# Patient Record
Sex: FEMALE | Race: WHITE | Employment: OTHER | URBAN - METROPOLITAN AREA
[De-identification: names, ages, dates, MRNs, and addresses within clinical notes are randomized per-mention and may not be internally consistent; named-entity substitution may affect disease eponyms.]

---

## 2020-04-05 ENCOUNTER — HOSPITAL ENCOUNTER (EMERGENCY)
Age: 45
Discharge: HOME | End: 2020-04-05
Payer: COMMERCIAL

## 2020-04-05 VITALS
TEMPERATURE: 97.9 F | RESPIRATION RATE: 17 BRPM | DIASTOLIC BLOOD PRESSURE: 70 MMHG | OXYGEN SATURATION: 100 % | HEART RATE: 74 BPM | SYSTOLIC BLOOD PRESSURE: 121 MMHG | HEIGHT: 68 IN

## 2020-04-05 LAB
ACETAMINOPHEN LEVEL: <5 UG/ML (ref 15–30)
ALBUMIN SERPL-MCNC: 4 GM/DL (ref 3.4–5)
ALCOHOL SCREEN SERUM: NORMAL %WT/VOL
ALP BLD-CCNC: 83 IU/L (ref 40–129)
ALT SERPL-CCNC: 28 U/L (ref 10–40)
ANION GAP SERPL CALCULATED.3IONS-SCNC: 11 MMOL/L (ref 4–16)
AST SERPL-CCNC: 23 IU/L (ref 15–37)
BASOPHILS ABSOLUTE: 0.1 K/CU MM
BASOPHILS RELATIVE PERCENT: 0.5 % (ref 0–1)
BILIRUB SERPL-MCNC: 0.2 MG/DL (ref 0–1)
BUN BLDV-MCNC: 15 MG/DL (ref 6–23)
CALCIUM SERPL-MCNC: 9.4 MG/DL (ref 8.3–10.6)
CHLORIDE BLD-SCNC: 107 MMOL/L (ref 99–110)
CO2: 20 MMOL/L (ref 21–32)
CREAT SERPL-MCNC: 0.7 MG/DL (ref 0.6–1.1)
DIFFERENTIAL TYPE: ABNORMAL
DOSE AMOUNT: ABNORMAL
DOSE AMOUNT: ABNORMAL
DOSE TIME: ABNORMAL
DOSE TIME: ABNORMAL
EOSINOPHILS ABSOLUTE: 0.1 K/CU MM
EOSINOPHILS RELATIVE PERCENT: 1.1 % (ref 0–3)
GFR AFRICAN AMERICAN: >60 ML/MIN/1.73M2
GFR NON-AFRICAN AMERICAN: >60 ML/MIN/1.73M2
GLUCOSE BLD-MCNC: 111 MG/DL (ref 70–99)
HCT VFR BLD CALC: 42 % (ref 37–47)
HEMOGLOBIN: 13.2 GM/DL (ref 12.5–16)
IMMATURE NEUTROPHIL %: 0.5 % (ref 0–0.43)
LYMPHOCYTES ABSOLUTE: 2.9 K/CU MM
LYMPHOCYTES RELATIVE PERCENT: 26.4 % (ref 24–44)
MCH RBC QN AUTO: 28.7 PG (ref 27–31)
MCHC RBC AUTO-ENTMCNC: 31.4 % (ref 32–36)
MCV RBC AUTO: 91.3 FL (ref 78–100)
MONOCYTES ABSOLUTE: 0.8 K/CU MM
MONOCYTES RELATIVE PERCENT: 7.3 % (ref 0–4)
NUCLEATED RBC %: 0 %
PDW BLD-RTO: 13.5 % (ref 11.7–14.9)
PLATELET # BLD: 261 K/CU MM (ref 140–440)
PMV BLD AUTO: 11.3 FL (ref 7.5–11.1)
POTASSIUM SERPL-SCNC: 3.3 MMOL/L (ref 3.5–5.1)
RBC # BLD: 4.6 M/CU MM (ref 4.2–5.4)
SALICYLATE LEVEL: <0.3 MG/DL (ref 15–30)
SEGMENTED NEUTROPHILS ABSOLUTE COUNT: 7 K/CU MM
SEGMENTED NEUTROPHILS RELATIVE PERCENT: 64.2 % (ref 36–66)
SODIUM BLD-SCNC: 138 MMOL/L (ref 135–145)
TOTAL IMMATURE NEUTOROPHIL: 0.05 K/CU MM
TOTAL NUCLEATED RBC: 0 K/CU MM
TOTAL PROTEIN: 7.3 GM/DL (ref 6.4–8.2)
TSH HIGH SENSITIVITY: 0.1 UIU/ML (ref 0.27–4.2)
WBC # BLD: 11 K/CU MM (ref 4–10.5)

## 2020-04-05 PROCEDURE — G0480 DRUG TEST DEF 1-7 CLASSES: HCPCS

## 2020-04-05 PROCEDURE — 4500000002 HC ER NO CHARGE

## 2020-04-05 PROCEDURE — 36415 COLL VENOUS BLD VENIPUNCTURE: CPT

## 2020-04-05 PROCEDURE — 80053 COMPREHEN METABOLIC PANEL: CPT

## 2020-04-05 PROCEDURE — 84443 ASSAY THYROID STIM HORMONE: CPT

## 2020-04-05 PROCEDURE — 85025 COMPLETE CBC W/AUTO DIFF WBC: CPT

## 2020-04-05 RX ORDER — DULOXETIN HYDROCHLORIDE 60 MG/1
60 CAPSULE, DELAYED RELEASE ORAL 2 TIMES DAILY
COMMUNITY

## 2020-04-05 RX ORDER — DULOXETIN HYDROCHLORIDE 30 MG/1
30 CAPSULE, DELAYED RELEASE ORAL 2 TIMES DAILY
COMMUNITY

## 2020-04-05 RX ORDER — CLONIDINE HYDROCHLORIDE 0.1 MG/1
0.1 TABLET ORAL
COMMUNITY

## 2020-04-05 SDOH — HEALTH STABILITY: MENTAL HEALTH: HOW OFTEN DO YOU HAVE A DRINK CONTAINING ALCOHOL?: NEVER

## 2020-04-05 NOTE — ED PROVIDER NOTES
EMERGENCY DEPARTMENT ENCOUNTER      PCP: No primary care provider on file. CHIEF COMPLAINT    Chief Complaint   Patient presents with    Mental Health Problem    Hallucinations    Paranoid       This patient was not evaluated by the attending physician. I have independently evaluated this patient. HPI    Anya Sharma is a 38 y.o. female who presents increased depressed mood and anxiety. Onset over the past few weeks. Patient states she recently moved to [facility]. Patient states her primary care provider is making medication adjustments and she states she has not been doing well with these medicines. Patient states she was started on Paxil 3 weeks ago and stopped this after taking it for a week due to the side effects. Patient states \"did not go well with my soul\". Patient denies hallucinations. Patient denies suicidal or homicidal ideation. Patient states she has been having trouble with drug use. Patient denies any chest pain, shortness of breath. Patient states she has had some lower abdominal cramping and some occasional diarrhea which seems to be improving. Patient denies any urinary symptoms. Patient denies pregnancy. REVIEW OF SYSTEMS    Psychiatric: See HPI. General: No fevers or chills  Cardiac:  No chest pain or palpitations  Respiratory: No difficulty breathing or new cough  Neurologic: No Headache or syncope  GI: see HPI  : No dysuria or hematuria  See HPI for further details. All other systems reviewed and are negative.     PAST MEDICAL & SURGICALHISTORY    Past Medical History:   Diagnosis Date    Hepatitis C     Stated by patient     Past Surgical History:   Procedure Laterality Date    HYSTERECTOMY         CURRENT MEDICATIONS    Current Outpatient Rx   Medication Sig Dispense Refill    DULoxetine (CYMBALTA) 30 MG extended release capsule Take 30 mg by mouth 2 times daily      DULoxetine (CYMBALTA) 60 MG extended release capsule Take 60 mg by mouth 2 times daily  cloNIDine (CATAPRES) 0.1 MG tablet Take 0.1 mg by mouth         ALLERGIES    Allergies   Allergen Reactions    Ceclor [Cefaclor] Hives    Haldol [Haloperidol Lactate] Swelling     Stated that tongue swelled with haldol       SOCIAL & FAMILYHISTORY    Social History     Socioeconomic History    Marital status: Not on file     Spouse name: Not on file    Number of children: Not on file    Years of education: Not on file    Highest education level: Not on file   Occupational History    Not on file   Social Needs    Financial resource strain: Not on file    Food insecurity     Worry: Not on file     Inability: Not on file    Transportation needs     Medical: Not on file     Non-medical: Not on file   Tobacco Use    Smoking status: Current Every Day Smoker     Types: Cigarettes   Substance and Sexual Activity    Alcohol use: Not Currently     Frequency: Never    Drug use: Yes     Types: Methamphetamines     Comment: recently used but trying to recover    Sexual activity: Not on file   Lifestyle    Physical activity     Days per week: Not on file     Minutes per session: Not on file    Stress: Not on file   Relationships    Social connections     Talks on phone: Not on file     Gets together: Not on file     Attends Pentecostal service: Not on file     Active member of club or organization: Not on file     Attends meetings of clubs or organizations: Not on file     Relationship status: Not on file    Intimate partner violence     Fear of current or ex partner: Not on file     Emotionally abused: Not on file     Physically abused: Not on file     Forced sexual activity: Not on file   Other Topics Concern    Not on file   Social History Narrative    Not on file     No family history on file. PHYSICAL EXAM    VITAL SIGNS: /70   Pulse 74   Temp 97.9 °F (36.6 °C) (Oral)   Resp 17   Ht 5' 8\" (1.727 m)   SpO2 100%   Constitutional:  Well developed, well nourished, anxious  Eyes:  EOMI. PERRL, conjunctiva normal   HENT:  Atraumatic, external ears normal, nose normal   Neck/Lymphatics: supple, no JVD, no swollen nodes.     Respiratory:  No respiratory distress, normal breath sounds  Cardiovascular:  Normal rate, normal rhythm, no murmurs  GI:  Soft, nondistended, normal bowel sounds, nontender  Musculoskeletal:  No edema, no acute deformities   Integument:  Well hydrated   Neurologic:  Awake alert and oriented, no slurred speech, normal gross motor coordination and strength   Psychiatric: Anxious, speaking rapidly      LABS:  Results for orders placed or performed during the hospital encounter of 04/05/20   CBC Auto Differential   Result Value Ref Range    WBC 11.0 (H) 4.0 - 10.5 K/CU MM    RBC 4.60 4.2 - 5.4 M/CU MM    Hemoglobin 13.2 12.5 - 16.0 GM/DL    Hematocrit 42.0 37 - 47 %    MCV 91.3 78 - 100 FL    MCH 28.7 27 - 31 PG    MCHC 31.4 (L) 32.0 - 36.0 %    RDW 13.5 11.7 - 14.9 %    Platelets 007 619 - 823 K/CU MM    MPV 11.3 (H) 7.5 - 11.1 FL    Differential Type AUTOMATED DIFFERENTIAL     Segs Relative 64.2 36 - 66 %    Lymphocytes % 26.4 24 - 44 %    Monocytes % 7.3 (H) 0 - 4 %    Eosinophils % 1.1 0 - 3 %    Basophils % 0.5 0 - 1 %    Segs Absolute 7.0 K/CU MM    Lymphocytes Absolute 2.9 K/CU MM    Monocytes Absolute 0.8 K/CU MM    Eosinophils Absolute 0.1 K/CU MM    Basophils Absolute 0.1 K/CU MM    Nucleated RBC % 0.0 %    Total Nucleated RBC 0.0 K/CU MM    Total Immature Neutrophil 0.05 K/CU MM    Immature Neutrophil % 0.5 (H) 0 - 0.43 %   Comprehensive Metabolic Panel   Result Value Ref Range    Sodium 138 135 - 145 MMOL/L    Potassium 3.3 (L) 3.5 - 5.1 MMOL/L    Chloride 107 99 - 110 mMol/L    CO2 20 (L) 21 - 32 MMOL/L    BUN 15 6 - 23 MG/DL    CREATININE 0.7 0.6 - 1.1 MG/DL    Glucose 111 (H) 70 - 99 MG/DL    Calcium 9.4 8.3 - 10.6 MG/DL    Alb 4.0 3.4 - 5.0 GM/DL    Total Protein 7.3 6.4 - 8.2 GM/DL    Total Bilirubin 0.2 0.0 - 1.0 MG/DL    ALT 28 10 - 40 U/L    AST 23 15 - 37 IU/L to that system including errors in grammar, punctuation, and spelling, as well as words and phrases that may be inappropriate. If there are any questions or concerns please feel free to contact the dictating provider for clarification.              Mike Stewart PA-C  04/05/20 1514

## 2020-04-06 ENCOUNTER — HOSPITAL ENCOUNTER (EMERGENCY)
Age: 45
Discharge: LEFT BEFORE DISCHARGE | End: 2020-04-06
Payer: COMMERCIAL

## 2020-04-06 ENCOUNTER — HOSPITAL ENCOUNTER (EMERGENCY)
Age: 45
Discharge: LEFT BEFORE DISCHARGE | End: 2020-04-06
Attending: EMERGENCY MEDICINE
Payer: COMMERCIAL

## 2020-04-06 ENCOUNTER — HOSPITAL ENCOUNTER (EMERGENCY)
Age: 45
Discharge: HOME | End: 2020-04-06
Payer: COMMERCIAL

## 2020-04-06 VITALS
HEART RATE: 97 BPM | DIASTOLIC BLOOD PRESSURE: 86 MMHG | BODY MASS INDEX: 21.22 KG/M2 | SYSTOLIC BLOOD PRESSURE: 112 MMHG | HEIGHT: 68 IN | WEIGHT: 140 LBS | RESPIRATION RATE: 18 BRPM | OXYGEN SATURATION: 100 %

## 2020-04-06 VITALS — HEIGHT: 68 IN | RESPIRATION RATE: 20 BRPM | BODY MASS INDEX: 21.22 KG/M2 | WEIGHT: 140 LBS

## 2020-04-06 PROCEDURE — 99283 EMERGENCY DEPT VISIT LOW MDM: CPT

## 2020-04-06 PROCEDURE — 6370000000 HC RX 637 (ALT 250 FOR IP): Performed by: PHYSICIAN ASSISTANT

## 2020-04-06 PROCEDURE — 99282 EMERGENCY DEPT VISIT SF MDM: CPT

## 2020-04-06 RX ORDER — IBUPROFEN 800 MG/1
800 TABLET ORAL ONCE
Status: COMPLETED | OUTPATIENT
Start: 2020-04-06 | End: 2020-04-06

## 2020-04-06 RX ORDER — IBUPROFEN 600 MG/1
600 TABLET ORAL EVERY 6 HOURS PRN
Qty: 20 TABLET | Refills: 0 | Status: SHIPPED | OUTPATIENT
Start: 2020-04-06 | End: 2020-08-13

## 2020-04-06 RX ORDER — DOXYCYCLINE HYCLATE 100 MG
100 TABLET ORAL ONCE
Status: COMPLETED | OUTPATIENT
Start: 2020-04-06 | End: 2020-04-06

## 2020-04-06 RX ORDER — FLUTICASONE PROPIONATE 50 MCG
1 SPRAY, SUSPENSION (ML) NASAL 2 TIMES DAILY
Qty: 1 BOTTLE | Refills: 0 | Status: SHIPPED | OUTPATIENT
Start: 2020-04-06

## 2020-04-06 RX ORDER — DOXYCYCLINE HYCLATE 100 MG
100 TABLET ORAL 2 TIMES DAILY
Qty: 14 TABLET | Refills: 0 | Status: SHIPPED | OUTPATIENT
Start: 2020-04-06 | End: 2020-04-13

## 2020-04-06 RX ORDER — HYDROXYZINE PAMOATE 25 MG/1
25 CAPSULE ORAL ONCE
Status: COMPLETED | OUTPATIENT
Start: 2020-04-06 | End: 2020-04-06

## 2020-04-06 RX ADMIN — DOXYCYCLINE HYCLATE 100 MG: 100 TABLET ORAL at 06:38

## 2020-04-06 RX ADMIN — HYDROXYZINE PAMOATE 25 MG: 25 CAPSULE ORAL at 06:38

## 2020-04-06 RX ADMIN — IBUPROFEN 800 MG: 800 TABLET, FILM COATED ORAL at 06:37

## 2020-04-06 ASSESSMENT — PAIN SCALES - GENERAL: PAINLEVEL_OUTOF10: 5

## 2020-04-06 NOTE — ED PROVIDER NOTES
chart records reviewed and incorporated. -  Supervising physician was Dr. Rupa Bryan. Patient was seen independently. -  Patient is eating a boxed lunch on my exam, no evidence of distress. She is currently homeless. She cannot tell me her medication list but states she fills at [facility] in [place]. She was advised we could wait until daytime hours to call and confirm her medication list with the pharmacy. She was here yesterday and had labs done which were unremarkable apart from a low TSH. She is agreeable with current plan. I was then informed patient was wanting to leave. I spoke with her and she states she just wants to go outside to see if a family member happens to be there. She was advised if she leaves she will have to sign in again. She is requesting I just call in her prescriptions to the pharmacy. She was advised that we do paper prescriptions and still need to confirm her medications with the pharmacy. She then began requesting medications while she is still here. I did agree to give her a Vistaril and Ibuprofen for her anxiety and chronic pain, as well as Keflex for a concerning skin infection to the left lower leg--likely from substance abuse, and Flonase for a complaint of nasal congestion. She is agreeable with plan of care and disposition. I did don/doff appropriate PPE (including surgical face mask, protective eye ware/safety glasses, gloves), as recommended by the health facility/national standard best practice, during my bedside interactions with the patient. Final Impression      1. Encounter for medication refill    2. Skin infection    3. Nasal congestion    4.  Anxiety state            [address] [place], [place]  04/06/20 6758

## 2020-04-06 NOTE — ED TRIAGE NOTES
Pt presents to the ED via Medic for walking into traffic. According to medic report, pt was walking into traffic and, after being redirected by [employer], would walk right back into traffic. Pt was seen here earlier today.

## 2020-04-07 ENCOUNTER — APPOINTMENT (OUTPATIENT)
Dept: CT IMAGING | Age: 45
End: 2020-04-07
Payer: COMMERCIAL

## 2020-04-07 ENCOUNTER — HOSPITAL ENCOUNTER (EMERGENCY)
Age: 45
Discharge: PSYCHIATRIC OR SUBSTANCE USE FACILITY | End: 2020-04-07
Attending: EMERGENCY MEDICINE
Payer: COMMERCIAL

## 2020-04-07 VITALS
RESPIRATION RATE: 16 BRPM | TEMPERATURE: 98.1 F | DIASTOLIC BLOOD PRESSURE: 82 MMHG | HEIGHT: 67 IN | OXYGEN SATURATION: 97 % | SYSTOLIC BLOOD PRESSURE: 144 MMHG | HEART RATE: 73 BPM | WEIGHT: 120 LBS | BODY MASS INDEX: 18.83 KG/M2

## 2020-04-07 LAB
ACETAMINOPHEN LEVEL: <5 UG/ML (ref 15–30)
ALBUMIN SERPL-MCNC: 3.3 GM/DL (ref 3.4–5)
ALCOHOL SCREEN SERUM: NORMAL %WT/VOL
ALP BLD-CCNC: 58 IU/L (ref 40–129)
ALT SERPL-CCNC: 5 U/L (ref 10–40)
AMPHETAMINES: ABNORMAL
ANION GAP SERPL CALCULATED.3IONS-SCNC: 9 MMOL/L (ref 4–16)
AST SERPL-CCNC: 77 IU/L (ref 15–37)
BACTERIA: ABNORMAL /HPF
BARBITURATE SCREEN URINE: NEGATIVE
BASOPHILS ABSOLUTE: 0 K/CU MM
BASOPHILS RELATIVE PERCENT: 0.5 % (ref 0–1)
BENZODIAZEPINE SCREEN, URINE: NEGATIVE
BILIRUB SERPL-MCNC: 0.3 MG/DL (ref 0–1)
BILIRUBIN URINE: NEGATIVE MG/DL
BLOOD, URINE: ABNORMAL
BUN BLDV-MCNC: 19 MG/DL (ref 6–23)
CALCIUM OXALATE CRYSTALS: ABNORMAL /HPF
CALCIUM SERPL-MCNC: 7.7 MG/DL (ref 8.3–10.6)
CANNABINOID SCREEN URINE: NEGATIVE
CHLORIDE BLD-SCNC: 109 MMOL/L (ref 99–110)
CLARITY: ABNORMAL
CO2: 18 MMOL/L (ref 21–32)
COCAINE METABOLITE: NEGATIVE
COLOR: YELLOW
CREAT SERPL-MCNC: 0.5 MG/DL (ref 0.6–1.1)
DIFFERENTIAL TYPE: ABNORMAL
DOSE AMOUNT: ABNORMAL
DOSE AMOUNT: ABNORMAL
DOSE TIME: ABNORMAL
DOSE TIME: ABNORMAL
EOSINOPHILS ABSOLUTE: 0.2 K/CU MM
EOSINOPHILS RELATIVE PERCENT: 2.8 % (ref 0–3)
GFR AFRICAN AMERICAN: >60 ML/MIN/1.73M2
GFR NON-AFRICAN AMERICAN: >60 ML/MIN/1.73M2
GLUCOSE BLD-MCNC: 99 MG/DL (ref 70–99)
GLUCOSE, URINE: NEGATIVE MG/DL
GONADOTROPIN, CHORIONIC (HCG) QUANT: NORMAL UIU/ML
HCT VFR BLD CALC: 36.2 % (ref 37–47)
HEMOGLOBIN: 11.4 GM/DL (ref 12.5–16)
IMMATURE NEUTROPHIL %: 0.2 % (ref 0–0.43)
KETONES, URINE: NEGATIVE MG/DL
LEUKOCYTE ESTERASE, URINE: ABNORMAL
LYMPHOCYTES ABSOLUTE: 2.8 K/CU MM
LYMPHOCYTES RELATIVE PERCENT: 33.6 % (ref 24–44)
MCH RBC QN AUTO: 28.8 PG (ref 27–31)
MCHC RBC AUTO-ENTMCNC: 31.5 % (ref 32–36)
MCV RBC AUTO: 91.4 FL (ref 78–100)
MONOCYTES ABSOLUTE: 0.6 K/CU MM
MONOCYTES RELATIVE PERCENT: 7.5 % (ref 0–4)
MUCUS: ABNORMAL HPF
NITRITE URINE, QUANTITATIVE: NEGATIVE
NUCLEATED RBC %: 0 %
OPIATES, URINE: NEGATIVE
OXYCODONE: ABNORMAL
PDW BLD-RTO: 13.6 % (ref 11.7–14.9)
PH, URINE: 6 (ref 5–8)
PHENCYCLIDINE, URINE: NEGATIVE
PLATELET # BLD: 222 K/CU MM (ref 140–440)
PMV BLD AUTO: 11.4 FL (ref 7.5–11.1)
POTASSIUM SERPL-SCNC: 5.2 MMOL/L (ref 3.5–5.1)
PROTEIN UA: NEGATIVE MG/DL
RBC # BLD: 3.96 M/CU MM (ref 4.2–5.4)
RBC URINE: 8 /HPF (ref 0–6)
SALICYLATE LEVEL: 1.1 MG/DL (ref 15–30)
SEGMENTED NEUTROPHILS ABSOLUTE COUNT: 4.6 K/CU MM
SEGMENTED NEUTROPHILS RELATIVE PERCENT: 55.4 % (ref 36–66)
SODIUM BLD-SCNC: 136 MMOL/L (ref 135–145)
SPECIFIC GRAVITY UA: 1.02 (ref 1–1.03)
SQUAMOUS EPITHELIAL: 10 /HPF
T4 FREE: 1.33 NG/DL (ref 0.9–1.8)
TOTAL IMMATURE NEUTOROPHIL: 0.02 K/CU MM
TOTAL NUCLEATED RBC: 0 K/CU MM
TOTAL PROTEIN: 6.4 GM/DL (ref 6.4–8.2)
TRANSITIONAL EPITHELIAL: <1 /HPF
TRICHOMONAS: ABNORMAL /HPF
TSH HIGH SENSITIVITY: 0.1 UIU/ML (ref 0.27–4.2)
UROBILINOGEN, URINE: NORMAL MG/DL (ref 0.2–1)
WBC # BLD: 8.3 K/CU MM (ref 4–10.5)
WBC UA: 8 /HPF (ref 0–5)

## 2020-04-07 PROCEDURE — 99285 EMERGENCY DEPT VISIT HI MDM: CPT

## 2020-04-07 PROCEDURE — 36415 COLL VENOUS BLD VENIPUNCTURE: CPT

## 2020-04-07 PROCEDURE — 80061 LIPID PANEL: CPT

## 2020-04-07 PROCEDURE — G0480 DRUG TEST DEF 1-7 CLASSES: HCPCS

## 2020-04-07 PROCEDURE — 84443 ASSAY THYROID STIM HORMONE: CPT

## 2020-04-07 PROCEDURE — 80053 COMPREHEN METABOLIC PANEL: CPT

## 2020-04-07 PROCEDURE — 6360000002 HC RX W HCPCS: Performed by: EMERGENCY MEDICINE

## 2020-04-07 PROCEDURE — 84439 ASSAY OF FREE THYROXINE: CPT

## 2020-04-07 PROCEDURE — 6370000000 HC RX 637 (ALT 250 FOR IP)

## 2020-04-07 PROCEDURE — 81001 URINALYSIS AUTO W/SCOPE: CPT

## 2020-04-07 PROCEDURE — 70450 CT HEAD/BRAIN W/O DYE: CPT

## 2020-04-07 PROCEDURE — 80307 DRUG TEST PRSMV CHEM ANLYZR: CPT

## 2020-04-07 PROCEDURE — 96372 THER/PROPH/DIAG INJ SC/IM: CPT

## 2020-04-07 PROCEDURE — 84702 CHORIONIC GONADOTROPIN TEST: CPT

## 2020-04-07 PROCEDURE — 83721 ASSAY OF BLOOD LIPOPROTEIN: CPT

## 2020-04-07 PROCEDURE — 85025 COMPLETE CBC W/AUTO DIFF WBC: CPT

## 2020-04-07 PROCEDURE — 6360000002 HC RX W HCPCS

## 2020-04-07 RX ORDER — HALOPERIDOL 5 MG/ML
INJECTION INTRAMUSCULAR
Status: DISPENSED
Start: 2020-04-07 | End: 2020-04-07

## 2020-04-07 RX ORDER — DIPHENHYDRAMINE HYDROCHLORIDE 50 MG/ML
INJECTION INTRAMUSCULAR; INTRAVENOUS
Status: COMPLETED
Start: 2020-04-07 | End: 2020-04-07

## 2020-04-07 RX ORDER — NICOTINE 21 MG/24HR
PATCH, TRANSDERMAL 24 HOURS TRANSDERMAL
Status: DISCONTINUED
Start: 2020-04-07 | End: 2020-04-07 | Stop reason: HOSPADM

## 2020-04-07 RX ORDER — LORAZEPAM 2 MG/ML
1 INJECTION INTRAMUSCULAR ONCE
Status: COMPLETED | OUTPATIENT
Start: 2020-04-07 | End: 2020-04-07

## 2020-04-07 RX ORDER — ZIPRASIDONE MESYLATE 20 MG/ML
INJECTION, POWDER, LYOPHILIZED, FOR SOLUTION INTRAMUSCULAR
Status: COMPLETED
Start: 2020-04-07 | End: 2020-04-07

## 2020-04-07 RX ORDER — LORAZEPAM 2 MG/ML
INJECTION INTRAMUSCULAR
Status: COMPLETED
Start: 2020-04-07 | End: 2020-04-07

## 2020-04-07 RX ORDER — NICOTINE 21 MG/24HR
1 PATCH, TRANSDERMAL 24 HOURS TRANSDERMAL DAILY
Status: DISCONTINUED | OUTPATIENT
Start: 2020-04-07 | End: 2020-04-07 | Stop reason: HOSPADM

## 2020-04-07 RX ADMIN — ZIPRASIDONE MESYLATE 10 MG: 20 INJECTION, POWDER, LYOPHILIZED, FOR SOLUTION INTRAMUSCULAR at 05:23

## 2020-04-07 RX ADMIN — DIPHENHYDRAMINE HYDROCHLORIDE: 50 INJECTION INTRAMUSCULAR; INTRAVENOUS at 05:23

## 2020-04-07 RX ADMIN — LORAZEPAM 2 MG: 2 INJECTION, SOLUTION INTRAMUSCULAR; INTRAVENOUS at 05:22

## 2020-04-07 RX ADMIN — LORAZEPAM 1 MG: 2 INJECTION, SOLUTION INTRAMUSCULAR; INTRAVENOUS at 17:21

## 2020-04-07 ASSESSMENT — ENCOUNTER SYMPTOMS
EYES NEGATIVE: 1
ALLERGIC/IMMUNOLOGIC NEGATIVE: 1
GASTROINTESTINAL NEGATIVE: 1
RESPIRATORY NEGATIVE: 1

## 2020-04-07 NOTE — ED PROVIDER NOTES
[facility]      TRIAGE CHIEF COMPLAINT:   Psychiatric Evaluation      [place]:  Mike Aguilera is a 38 y.o. female that presents for mental health problem. Patient has been here multiple times in the past 24 hours. This is been for the same thing. Patient has been here in [email or web address] for a month she states. She has no SI HI but is using drugs, hallucinating she is trying to find her dead child. Patient is a very poor historian she is actively hallucinating I do not see any signs of trauma she is here by herself work-up performed her previous work-up has been negative    REVIEW OF SYSTEMS:  At least 10 systems reviewed and otherwise acutely negative except as in the [place] . Review of Systems   Constitutional: Negative. HENT: Negative. Eyes: Negative. Respiratory: Negative. Cardiovascular: Negative. Gastrointestinal: Negative. Endocrine: Negative. Genitourinary: Negative. Musculoskeletal: Negative. Skin: Negative. Allergic/Immunologic: Negative. Neurological: Negative. Hematological: Negative. Psychiatric/Behavioral: Positive for dysphoric mood, hallucinations and sleep disturbance. Negative for suicidal ideas. The patient is nervous/anxious. All other systems reviewed and are negative. Past Medical History:   Diagnosis Date    Hepatitis C     Stated by patient     Past Surgical History:   Procedure Laterality Date    HYSTERECTOMY       No family history on file.   Social History     Socioeconomic History    Marital status: Unknown     Spouse name: Not on file    Number of children: Not on file    Years of education: Not on file    Highest education level: Not on file   Occupational History    Not on file   Social Needs    Financial resource strain: Not on file    Food insecurity     Worry: Not on file     Inability: Not on file    Transportation needs     Medical: Not on file     Non-medical: Not on file   Tobacco Use    Smoking status: Current Every Day Smoker     Types: Cigarettes    Smokeless tobacco: Never Used   Substance and Sexual Activity    Alcohol use: Not Currently     Frequency: Never    Drug use: Yes     Types: Methamphetamines     Comment: recently used but trying to recover    Sexual activity: Yes   Lifestyle    Physical activity     Days per week: Not on file     Minutes per session: Not on file    Stress: Not on file   Relationships    Social connections     Talks on phone: Not on file     Gets together: Not on file     Attends Evangelical service: Not on file     Active member of club or organization: Not on file     Attends meetings of clubs or organizations: Not on file     Relationship status: Not on file    Intimate partner violence     Fear of current or ex partner: Not on file     Emotionally abused: Not on file     Physically abused: Not on file     Forced sexual activity: Not on file   Other Topics Concern    Not on file   Social History Narrative    Not on file     Current Facility-Administered Medications   Medication Dose Route Frequency Provider Last Rate Last Dose    haloperidol lactate (HALDOL) 5 MG/ML injection              Current Outpatient Medications   Medication Sig Dispense Refill    ibuprofen (ADVIL;MOTRIN) 600 MG tablet Take 1 tablet by mouth every 6 hours as needed for Pain 20 tablet 0    doxycycline hyclate (VIBRA-TABS) 100 MG tablet Take 1 tablet by mouth 2 times daily for 7 days 14 tablet 0    fluticasone (FLONASE) 50 MCG/ACT nasal spray 1 spray by Nasal route 2 times daily 1 Bottle 0    DULoxetine (CYMBALTA) 30 MG extended release capsule Take 30 mg by mouth 2 times daily      DULoxetine (CYMBALTA) 60 MG extended release capsule Take 60 mg by mouth 2 times daily      cloNIDine (CATAPRES) 0.1 MG tablet Take 0.1 mg by mouth        Allergies   Allergen Reactions    Ceclor [Cefaclor] Hives    Haldol [Haloperidol Lactate] Swelling     Stated that tongue swelled with haldol to find placement otherwise she is medically cleared drug screen is positive head CT is negative labs negative no signs of trauma or infection. I will sign outpatient to Dr. Martina Bang. CLINICAL IMPRESSION:  Final diagnoses:   Mental health problem       (Please note that portions of this note may have been completed with a voice recognition program. Efforts were made to edit the dictations but occasionally words aremis-transcribed.)    DISPOSITION REFERRAL (if applicable):  No follow-up provider specified.     DISPOSITION MEDICATIONS (if applicable):  New Prescriptions    No medications on file          Rupinder Figueroa, [facility],   04/07/20 3502

## 2020-04-07 NOTE — ED NOTES
Pt walked outside to be with significant other at this time. Pt aware of no visitor policy.       Saul Burrell RN  04/06/20 2036
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
## 2020-04-09 LAB
CHOLESTEROL: 109 MG/DL
HDLC SERPL-MCNC: 67 MG/DL
LDL CHOLESTEROL DIRECT: 41 MG/DL
TRIGL SERPL-MCNC: 38 MG/DL

## 2020-04-11 ENCOUNTER — HOSPITAL ENCOUNTER (EMERGENCY)
Age: 45
Discharge: HOME | End: 2020-04-11
Attending: EMERGENCY MEDICINE
Payer: COMMERCIAL

## 2020-04-11 VITALS
OXYGEN SATURATION: 98 % | SYSTOLIC BLOOD PRESSURE: 128 MMHG | DIASTOLIC BLOOD PRESSURE: 78 MMHG | TEMPERATURE: 97.5 F | RESPIRATION RATE: 16 BRPM | WEIGHT: 135 LBS | BODY MASS INDEX: 20.46 KG/M2 | HEIGHT: 68 IN | HEART RATE: 123 BPM

## 2020-04-11 LAB
AMPHETAMINES: ABNORMAL
BARBITURATE SCREEN URINE: NEGATIVE
BENZODIAZEPINE SCREEN, URINE: NEGATIVE
CANNABINOID SCREEN URINE: NEGATIVE
COCAINE METABOLITE: NEGATIVE
INTERPRETATION: NORMAL
OPIATES, URINE: NEGATIVE
OXYCODONE: ABNORMAL
PHENCYCLIDINE, URINE: NEGATIVE
PREGNANCY, URINE: NEGATIVE
SPECIFIC GRAVITY, URINE: 1.02 (ref 1–1.03)

## 2020-04-11 PROCEDURE — 6370000000 HC RX 637 (ALT 250 FOR IP): Performed by: EMERGENCY MEDICINE

## 2020-04-11 PROCEDURE — 99283 EMERGENCY DEPT VISIT LOW MDM: CPT

## 2020-04-11 PROCEDURE — 80307 DRUG TEST PRSMV CHEM ANLYZR: CPT

## 2020-04-11 PROCEDURE — 81025 URINE PREGNANCY TEST: CPT

## 2020-04-11 RX ORDER — DIPHENHYDRAMINE HYDROCHLORIDE 50 MG/ML
50 INJECTION INTRAMUSCULAR; INTRAVENOUS ONCE
Status: DISCONTINUED | OUTPATIENT
Start: 2020-04-11 | End: 2020-04-11

## 2020-04-11 RX ORDER — PREDNISONE 20 MG/1
60 TABLET ORAL ONCE
Status: COMPLETED | OUTPATIENT
Start: 2020-04-11 | End: 2020-04-11

## 2020-04-11 RX ORDER — BACITRACIN ZINC AND POLYMYXIN B SULFATE 500; 1000 [USP'U]/G; [USP'U]/G
OINTMENT TOPICAL
Qty: 1 TUBE | Refills: 1 | Status: SHIPPED | OUTPATIENT
Start: 2020-04-11 | End: 2020-04-18

## 2020-04-11 RX ORDER — HYDROXYZINE HYDROCHLORIDE 25 MG/1
25 TABLET, FILM COATED ORAL EVERY 6 HOURS PRN
Qty: 20 TABLET | Refills: 0 | Status: SHIPPED | OUTPATIENT
Start: 2020-04-11 | End: 2020-04-21

## 2020-04-11 RX ORDER — DIAPER,BRIEF,INFANT-TODD,DISP
EACH MISCELLANEOUS ONCE
Status: COMPLETED | OUTPATIENT
Start: 2020-04-11 | End: 2020-04-11

## 2020-04-11 RX ORDER — PREDNISONE 10 MG/1
TABLET ORAL
Qty: 30 TABLET | Refills: 0 | Status: SHIPPED | OUTPATIENT
Start: 2020-04-11 | End: 2020-04-21

## 2020-04-11 RX ADMIN — BACITRACIN: 500 OINTMENT TOPICAL at 22:30

## 2020-04-11 RX ADMIN — PREDNISONE 60 MG: 20 TABLET ORAL at 22:29

## 2020-04-12 ENCOUNTER — HOSPITAL ENCOUNTER (OUTPATIENT)
Age: 45
Setting detail: SPECIMEN
Discharge: HOME | End: 2020-04-12

## 2020-04-12 NOTE — ED NOTES
Patient left without belongings and paperwork and scripts. Belongings given to securitiy.      Ronaldo Mcknight RN  04/11/20 1347

## 2020-06-18 ENCOUNTER — HOSPITAL ENCOUNTER (EMERGENCY)
Age: 45
Discharge: HOME | End: 2020-06-18
Payer: COMMERCIAL

## 2020-06-18 VITALS
BODY MASS INDEX: 20.46 KG/M2 | HEART RATE: 90 BPM | RESPIRATION RATE: 20 BRPM | DIASTOLIC BLOOD PRESSURE: 74 MMHG | SYSTOLIC BLOOD PRESSURE: 122 MMHG | HEIGHT: 68 IN | WEIGHT: 135 LBS | TEMPERATURE: 98.2 F | OXYGEN SATURATION: 100 %

## 2020-06-18 LAB
BACTERIA: NEGATIVE /HPF
BILIRUBIN URINE: ABNORMAL MG/DL
BLOOD, URINE: ABNORMAL
CLARITY: ABNORMAL
COLOR: ABNORMAL
GLUCOSE, URINE: NEGATIVE MG/DL
ICTOTEST: NEGATIVE
KETONES, URINE: ABNORMAL MG/DL
LEUKOCYTE ESTERASE, URINE: ABNORMAL
MUCUS: ABNORMAL HPF
NITRITE URINE, QUANTITATIVE: NEGATIVE
PH, URINE: 5 (ref 5–8)
PROTEIN UA: 100 MG/DL
RBC URINE: 7 /HPF (ref 0–6)
SPECIFIC GRAVITY UA: 1.03 (ref 1–1.03)
SQUAMOUS EPITHELIAL: 9 /HPF
TRICHOMONAS: ABNORMAL /HPF
UROBILINOGEN, URINE: NORMAL MG/DL (ref 0.2–1)
WBC UA: 22 /HPF (ref 0–5)

## 2020-06-18 PROCEDURE — 6370000000 HC RX 637 (ALT 250 FOR IP): Performed by: PHYSICIAN ASSISTANT

## 2020-06-18 PROCEDURE — 6360000002 HC RX W HCPCS: Performed by: PHYSICIAN ASSISTANT

## 2020-06-18 PROCEDURE — 96372 THER/PROPH/DIAG INJ SC/IM: CPT

## 2020-06-18 PROCEDURE — 99283 EMERGENCY DEPT VISIT LOW MDM: CPT

## 2020-06-18 PROCEDURE — 81001 URINALYSIS AUTO W/SCOPE: CPT

## 2020-06-18 RX ORDER — NAPROXEN 500 MG/1
500 TABLET ORAL 2 TIMES DAILY PRN
Qty: 30 TABLET | Refills: 0 | Status: SHIPPED | OUTPATIENT
Start: 2020-06-18

## 2020-06-18 RX ORDER — PREDNISONE 50 MG/1
50 TABLET ORAL DAILY
Qty: 7 TABLET | Refills: 0 | Status: SHIPPED | OUTPATIENT
Start: 2020-06-18 | End: 2020-06-25

## 2020-06-18 RX ORDER — HYDROXYZINE PAMOATE 25 MG/1
25 CAPSULE ORAL 3 TIMES DAILY PRN
Qty: 20 CAPSULE | Refills: 0 | Status: SHIPPED | OUTPATIENT
Start: 2020-06-18 | End: 2020-08-13

## 2020-06-18 RX ORDER — GABAPENTIN 300 MG/1
300 CAPSULE ORAL 2 TIMES DAILY
Qty: 28 CAPSULE | Refills: 0 | Status: SHIPPED | OUTPATIENT
Start: 2020-06-18 | End: 2020-07-02

## 2020-06-18 RX ORDER — METHOCARBAMOL 500 MG/1
500 TABLET, FILM COATED ORAL 4 TIMES DAILY
Qty: 20 TABLET | Refills: 0 | Status: SHIPPED | OUTPATIENT
Start: 2020-06-18

## 2020-06-18 RX ORDER — NITROFURANTOIN 25; 75 MG/1; MG/1
100 CAPSULE ORAL 2 TIMES DAILY
Qty: 14 CAPSULE | Refills: 0 | Status: SHIPPED | OUTPATIENT
Start: 2020-06-18 | End: 2020-06-25

## 2020-06-18 RX ORDER — METHOCARBAMOL 750 MG/1
750 TABLET, FILM COATED ORAL ONCE
Status: COMPLETED | OUTPATIENT
Start: 2020-06-18 | End: 2020-06-18

## 2020-06-18 RX ORDER — KETOROLAC TROMETHAMINE 30 MG/ML
30 INJECTION, SOLUTION INTRAMUSCULAR; INTRAVENOUS ONCE
Status: COMPLETED | OUTPATIENT
Start: 2020-06-18 | End: 2020-06-18

## 2020-06-18 RX ADMIN — METHOCARBAMOL TABLETS 750 MG: 750 TABLET, COATED ORAL at 14:57

## 2020-06-18 RX ADMIN — KETOROLAC TROMETHAMINE 30 MG: 30 INJECTION, SOLUTION INTRAMUSCULAR; INTRAVENOUS at 14:56

## 2020-06-18 RX ADMIN — PREDNISONE 40 MG: 20 TABLET ORAL at 14:57

## 2020-06-18 ASSESSMENT — PAIN SCALES - GENERAL
PAINLEVEL_OUTOF10: 7

## 2020-06-18 ASSESSMENT — PAIN DESCRIPTION - PAIN TYPE: TYPE: ACUTE PAIN

## 2020-06-18 NOTE — ED NOTES
This nurse and [facility] EDT attempt to call the patient's  because she was unable to get ahold of him, but he is not answering at the number she gives. The patient is very restless and upset.       Chas Huggins RN  06/18/20 7268

## 2020-06-18 NOTE — ED PROVIDER NOTES
eMERGENCY dEPARTMENT eNCOUnter      PCP: No primary care provider on file. CHIEF COMPLAINT    Chief Complaint   Patient presents with    Hip Pain     right, believes due to sciatica    Dysuria       HPI    Destiny Weiss is a 38 y.o. female who presents with right-sided low back and hip pain. Patient endorses history of sciatica and states current symptoms feel similar. She states this pain has been ongoing for \"a long time\". She states pain is more significant in the lateral aspect of her hip and into her buttock region, however does radiate at times into the right lower extremity. She has not tried any medications at home for relief of symptoms. She denies any recent injury or trauma. Patient denies fever, chills, bowel/bladder incontinence, urine retention, saddle anesthesia, lower extremity weakness or altered sensation. Patient denies personal history of diabetes mellitus, spinal abnormalities, recent spinal trauma, IV drug use, or cancer. Patient also requesting for her urine to be checked for any signs of infection and is also requesting a medication refill. REVIEW OF SYSTEMS    Constitutional:  Denies fever, chills, weight loss, or weakness. Cardiovascular:  Denies chest pain, palpitations, or swelling  Respiratory:  Denies cough or shortness of breath    GI:  Denies abdominal pain, nausea, vomiting, or diarrhea  :  Denies any urinary symptoms or vaginal symptoms. Denies pregnancy- s/p hysterectomy  Musculoskeletal:  See HPI above   Skin:  Denies rash  Neurologic:   See HPI No bowel incontinence or bladder retention, no saddle anesthesia. +radicular symptoms. No lower extremity weakness or altered sensation.   Endocrine:  Denies polyuria or polydypsia   Lymphatic:  Denies swollen glands     All other review of systems are negative  See HPI and nursing notes for additional information       PAST MEDICAL & SURGICAL HISTORY    Past Medical History:   Diagnosis Date    Depression     Fibromyalgia 2020    Hepatitis C     Stated by patient     Past Surgical History:   Procedure Laterality Date    HYSTERECTOMY         CURRENT MEDICATIONS    Current Outpatient Rx   Medication Sig Dispense Refill    nitrofurantoin, macrocrystal-monohydrate, (MACROBID) 100 MG capsule Take 1 capsule by mouth 2 times daily for 7 days 14 capsule 0    hydrOXYzine (VISTARIL) 25 MG capsule Take 1 capsule by mouth 3 times daily as needed for Itching or Anxiety 20 capsule 0    gabapentin (NEURONTIN) 300 MG capsule Take 1 capsule by mouth 2 times daily for 14 days. 28 capsule 0    predniSONE (DELTASONE) 50 MG tablet Take 1 tablet by mouth daily for 7 days 7 tablet 0    methocarbamol (ROBAXIN) 500 MG tablet Take 1 tablet by mouth 4 times daily As needed for muscle spasm.  20 tablet 0    naproxen (NAPROSYN) 500 MG tablet Take 1 tablet by mouth 2 times daily as needed for Pain 30 tablet 0    ibuprofen (ADVIL;MOTRIN) 600 MG tablet Take 1 tablet by mouth every 6 hours as needed for Pain 20 tablet 0    fluticasone (FLONASE) 50 MCG/ACT nasal spray 1 spray by Nasal route 2 times daily 1 Bottle 0    DULoxetine (CYMBALTA) 30 MG extended release capsule Take 30 mg by mouth 2 times daily      DULoxetine (CYMBALTA) 60 MG extended release capsule Take 60 mg by mouth 2 times daily      cloNIDine (CATAPRES) 0.1 MG tablet Take 0.1 mg by mouth         ALLERGIES    Allergies   Allergen Reactions    Ceclor [Cefaclor] Hives    Haldol [Haloperidol Lactate] Swelling     Stated that tongue swelled with haldol       SOCIAL HISTORY    Social History     Socioeconomic History    Marital status: Unknown     Spouse name: None    Number of children: None    Years of education: None    Highest education level: None   Occupational History    None   Social Needs    Financial resource strain: None    Food insecurity     Worry: None     Inability: None    Transportation needs     Medical: None     Non-medical: None   Tobacco Use    Smoking status: Former Smoker     Types: Cigarettes     Last attempt to quit: 2020     Years since quittin.2    Smokeless tobacco: Never Used   Substance and Sexual Activity    Alcohol use: Not Currently     Frequency: Never    Drug use: Not Currently     Types: Methamphetamines     Comment: recently used but trying to recover    Sexual activity: Yes   Lifestyle    Physical activity     Days per week: None     Minutes per session: None    Stress: None   Relationships    Social connections     Talks on phone: None     Gets together: None     Attends Adventist service: None     Active member of club or organization: None     Attends meetings of clubs or organizations: None     Relationship status: None    Intimate partner violence     Fear of current or ex partner: None     Emotionally abused: None     Physically abused: None     Forced sexual activity: None   Other Topics Concern    None   Social History Narrative    None       PHYSICAL EXAM    VITAL SIGNS: /74   Pulse 90   Temp 98.2 °F (36.8 °C)   Resp 20   Ht 5' 8\" (1.727 m)   Wt 135 lb (61.2 kg)   SpO2 100%   BMI 20.53 kg/m²    Patient was noted to be afebrile    Constitutional:  Well developed, well nourished. HENT:  Atraumatic, moist mucus membranes. Eyes:  No orbital trauma. No scleral icterus. Neck: No JVD, supple. No enlarged lymph nodes. Cardiovascular:  Normal rate, regular rhythm, no murmurs/rubs/gallops  Respiratory:  No respiratory distress, normal breath sounds. Abdomen: Bowel sounds normal, abdomen soft, no tenderness, no masses. Musculoskeletal:  No edema, no deformities. Right hip without bruising, swelling or erythema. Mild discomfort to palpation into right SI joint into lateral aspect of right hip. Patient has full range of motion of right hip. No skin changes, tenderness or range of motion deficit of right knee. Sensation intact throughout right lower extremity. Dorsalis pedis pulse 2+.   Soft

## 2020-06-18 NOTE — ED NOTES
The patient is walking past the nurses desk and is asking for a phone. This nurse explains that there is a phone for public use in the waiting room. She walks out to the waiting room at that time.       Sarah Snow RN  06/18/20 3957

## 2020-08-05 ENCOUNTER — HOSPITAL ENCOUNTER (EMERGENCY)
Age: 45
Discharge: HOME | End: 2020-08-05
Attending: EMERGENCY MEDICINE
Payer: COMMERCIAL

## 2020-08-05 VITALS
RESPIRATION RATE: 18 BRPM | DIASTOLIC BLOOD PRESSURE: 84 MMHG | OXYGEN SATURATION: 99 % | WEIGHT: 150 LBS | TEMPERATURE: 98.4 F | BODY MASS INDEX: 22.73 KG/M2 | SYSTOLIC BLOOD PRESSURE: 124 MMHG | HEIGHT: 68 IN | HEART RATE: 82 BPM

## 2020-08-05 PROCEDURE — 99282 EMERGENCY DEPT VISIT SF MDM: CPT

## 2020-08-05 PROCEDURE — 6370000000 HC RX 637 (ALT 250 FOR IP): Performed by: EMERGENCY MEDICINE

## 2020-08-05 RX ORDER — IBUPROFEN 600 MG/1
600 TABLET ORAL 3 TIMES DAILY PRN
Qty: 30 TABLET | Refills: 0 | Status: SHIPPED | OUTPATIENT
Start: 2020-08-05 | End: 2020-08-13

## 2020-08-05 RX ORDER — IBUPROFEN 600 MG/1
600 TABLET ORAL ONCE
Status: COMPLETED | OUTPATIENT
Start: 2020-08-05 | End: 2020-08-05

## 2020-08-05 RX ORDER — PREDNISONE 10 MG/1
40 TABLET ORAL DAILY
Qty: 20 TABLET | Refills: 0 | Status: SHIPPED | OUTPATIENT
Start: 2020-08-05 | End: 2020-08-10

## 2020-08-05 RX ORDER — PERMETHRIN 50 MG/G
CREAM TOPICAL
Qty: 1 TUBE | Refills: 0 | Status: SHIPPED | OUTPATIENT
Start: 2020-08-05

## 2020-08-05 RX ORDER — LIDOCAINE HYDROCHLORIDE 10 MG/ML
5 INJECTION, SOLUTION INFILTRATION; PERINEURAL ONCE
Status: DISCONTINUED | OUTPATIENT
Start: 2020-08-05 | End: 2020-08-05

## 2020-08-05 RX ORDER — LIDOCAINE 4 G/G
1 PATCH TOPICAL DAILY
Status: DISCONTINUED | OUTPATIENT
Start: 2020-08-05 | End: 2020-08-05 | Stop reason: HOSPADM

## 2020-08-05 RX ORDER — LIDOCAINE 4 G/G
1 PATCH TOPICAL DAILY
Qty: 5 PATCH | Refills: 0 | Status: SHIPPED | OUTPATIENT
Start: 2020-08-05 | End: 2020-08-10

## 2020-08-05 RX ADMIN — IBUPROFEN 600 MG: 600 TABLET, FILM COATED ORAL at 06:53

## 2020-08-05 ASSESSMENT — PAIN SCALES - GENERAL: PAINLEVEL_OUTOF10: 5

## 2020-08-05 NOTE — ED PROVIDER NOTES
Emergency Department Encounter    Patient: Alesia Paez  MRN: [id number]  : 1975  Date of Evaluation: 2020  ED Provider:  Rian Beatty      Triage Chief Complaint:   Rash    [place]:  Alesia Paez is a 41 y.o. female that presents with right-sided low back pain and a rash. Patient has a history of chronic low back pain on the right side. The pain is primarily surrounding her SI joint. Patient states that this is been getting worse over the last week. She denies any specific injury causing the symptoms. The pain is not midline. She does have a history of drug use but has not used in several months. Patient also states that where she is staying has bedbugs. She has noticed small bites on her legs and on her lower back. They are pruritic. She would like to be treated for these bites at this time. She has no other complaints at this time. No numbness tingling or weakness, saddle anesthesia, no bowel or bladder dysfunction,     ROS - see HPI, below listed is current ROS at time of my eval:  General:  No fevers  ENT:  No sore throat, no nasal congestion  Cardiovascular:  No chest pain  Respiratory:  No shortness of breath  Gastrointestinal:  No pain, no nausea, no vomiting, no diarrhea  Musculoskeletal:  + back pain, + muscle pain  Skin:  No rash, + pruritisn  Neurologic:  No headache, no extremity numbness, no extremity tingling, no extremity weakness  Genitourinary:  No dysuria, no hematuria  Endocrine:  No unexpected weight gain, no unexpected weight loss  Extremities:  no edema, no pain    Past Medical History:   Diagnosis Date    Depression     Fibromyalgia 2020    Hepatitis C     Stated by patient     Past Surgical History:   Procedure Laterality Date    HYSTERECTOMY       No family history on file.   Social History     Socioeconomic History    Marital status: Unknown     Spouse name: Not on file    Number of children: Not on file    Years of education: Not on file    Highest education level: Not on file   Occupational History    Not on file   Social Needs    Financial resource strain: Not on file    Food insecurity     Worry: Not on file     Inability: Not on file    Transportation needs     Medical: Not on file     Non-medical: Not on file   Tobacco Use    Smoking status: Former Smoker     Types: Cigarettes     Last attempt to quit: 2020     Years since quittin.3    Smokeless tobacco: Never Used   Substance and Sexual Activity    Alcohol use: Not Currently     Frequency: Never    Drug use: Not Currently     Types: Methamphetamines     Comment: recently used but trying to recover    Sexual activity: Yes   Lifestyle    Physical activity     Days per week: Not on file     Minutes per session: Not on file    Stress: Not on file   Relationships    Social connections     Talks on phone: Not on file     Gets together: Not on file     Attends [employer] service: Not on file     Active member of club or organization: Not on file     Attends meetings of clubs or organizations: Not on file     Relationship status: Not on file    Intimate partner violence     Fear of current or ex partner: Not on file     Emotionally abused: Not on file     Physically abused: Not on file     Forced sexual activity: Not on file   Other Topics Concern    Not on file   Social History Narrative    Not on file     Current Facility-Administered Medications   Medication Dose Route Frequency Provider Last Rate Last Dose    ibuprofen (ADVIL;MOTRIN) tablet 600 mg  600 mg Oral Once Danae Prudent, DO         Current Outpatient Medications   Medication Sig Dispense Refill    permethrin (ELIMITE) 5 % cream Apply topically as directed 1 Tube 0    predniSONE (DELTASONE) 10 MG tablet Take 4 tablets by mouth daily for 5 days 20 tablet 0    ibuprofen (ADVIL;MOTRIN) 600 MG tablet Take 1 tablet by mouth 3 times daily as needed for Pain 30 tablet 0    hydrOXYzine (VISTARIL) 25 MG capsule Take 1 capsule by mouth 3 times daily as needed for Itching or Anxiety 20 capsule 0    gabapentin (NEURONTIN) 300 MG capsule Take 1 capsule by mouth 2 times daily for 14 days. 28 capsule 0    methocarbamol (ROBAXIN) 500 MG tablet Take 1 tablet by mouth 4 times daily As needed for muscle spasm. 20 tablet 0    naproxen (NAPROSYN) 500 MG tablet Take 1 tablet by mouth 2 times daily as needed for Pain 30 tablet 0    ibuprofen (ADVIL;MOTRIN) 600 MG tablet Take 1 tablet by mouth every 6 hours as needed for Pain 20 tablet 0    fluticasone (FLONASE) 50 MCG/ACT nasal spray 1 spray by Nasal route 2 times daily 1 Bottle 0    DULoxetine (CYMBALTA) 30 MG extended release capsule Take 30 mg by mouth 2 times daily      DULoxetine (CYMBALTA) 60 MG extended release capsule Take 60 mg by mouth 2 times daily      cloNIDine (CATAPRES) 0.1 MG tablet Take 0.1 mg by mouth       Allergies   Allergen Reactions    Ceclor [Cefaclor] Hives    Haldol [Haloperidol Lactate] Swelling     Stated that tongue swelled with haldol       Nursing Notes Reviewed    Physical Exam:  Triage VS:    ED Triage Vitals [08/05/20 0544]   Enc Vitals Group      /84      Pulse 82      Resp 18      Temp 98.4 °F (36.9 °C)      Temp Source Oral      SpO2 99 %      Weight 150 lb (68 kg)      Height 5' 8\" (1.727 m)      Head Circumference       Peak Flow       Pain Score       Pain Loc       Pain Edu? Excl. in [address]? My pulse ox interpretation is - normal    General appearance:  No acute distress. Skin:  Warm. Dry. No Rash. Small bites on her lower legs and her lower back these are consistent with bedbugs. Eye:  Extraocular movements intact. Ears, nose, mouth and throat:  Oral mucosa moist   Neck:  Trachea midline. Extremity:  No swelling. Normal ROM     Heart:  Regular  Perfusion:  intact  Respiratory:   Respirations nonlabored. Abdominal:  Normal bowel sounds. Soft. Nontender. Non distended.   Back: There is tenderness to the musculature surrounding her SI joint, no CVA tenderness to palpation, no midline spinal tenderness to palpation or step-offs, no paraspinal muscle tenderness to palpation, no spasm, no midline tenderness          Neurological:  Alert and oriented times 3.  5 out of 5 motor strength bilateral lower extremities, sensation intact to light touch in bilateral lower                                  extremities, lower extremity reflexes of the patella and achilles are equal and intact. Straight leg test is not present. I have reviewed and interpreted all of the currently available lab results from this visit (if applicable):  No results found for this visit on 08/05/20. Radiographs (if obtained):  Radiologist's Report Reviewed:  No results found. MDM:  Patient presented with back pain. There is no evidence for more malignant injury/pathology, such as, but not limited to, cauda equina syndrome, acute spinal cord pathology, Spinal epidural abscess    I completed a structured, evidence-based clinical evaluation to screen for acute non-traumatic spinal emergencies. The patient has a normal detailed neurologic exam and a low red flag score. Patient does not have any urinary complaints or abdominal complaints. Patient taking medications at home with no significant improvement. The evidence indicates that the patient is very low risk for an acute spinal emergency and this is consistent with my clinical intuition, therefore, it is in the patients best interest not to do additional emergent testing. Patient will also be given a prescription for likely bedbugs. Patient will be given medications, I do believe the patient is a good candidate for outpatient symptomatic treatment.   The patient was instructed on this treatment and the course that this type of back pain typically follows, I also discussed worrisome symptoms to monitor for at home including, but not limited to, numbness or weakness in the lower extremities, bowel or bladder dysfunction, and numbness in the groin. Patient will be discharged with prescriptions, instructions for symptomatic treatment, monitoring and outpatient follow-up, patient understands and agrees, return warnings given      Clinical Impression:  1. Chronic right-sided low back pain without sciatica    2. Bedbug bite, initial encounter      Disposition referral (if applicable):  [facility] Emergency Department  [address] [id number]  [phone]  Go to   If symptoms worsen    [facility] - ADULT  [address]  [phone]  Schedule an appointment as soon as possible for a visit in 2 days  follow up back pain and bed bugs    Disposition medications (if applicable):  New Prescriptions    IBUPROFEN (ADVIL;MOTRIN) 600 MG TABLET    Take 1 tablet by mouth 3 times daily as needed for Pain    PERMETHRIN (ELIMITE) 5 % CREAM    Apply topically as directed    PREDNISONE (DELTASONE) 10 MG TABLET    Take 4 tablets by mouth daily for 5 days     ED Provider Disposition Time  DISPOSITION Discharge - Pending Orders Complete 08/05/2020 06:29:05 AM      Comment: Please note this report has been produced using speech recognition software and may contain errors related to that system including errors in grammar, punctuation, and spelling, as well as words and phrases that may be inappropriate. Efforts were made to edit the dictations.       Rhonda Contreras DO  08/05/20 5269
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
## 2020-08-12 ENCOUNTER — HOSPITAL ENCOUNTER (EMERGENCY)
Age: 45
Discharge: HOME | End: 2020-08-13
Payer: COMMERCIAL

## 2020-08-12 ENCOUNTER — APPOINTMENT (OUTPATIENT)
Dept: GENERAL RADIOLOGY | Age: 45
End: 2020-08-12
Payer: COMMERCIAL

## 2020-08-12 PROCEDURE — 73552 X-RAY EXAM OF FEMUR 2/>: CPT

## 2020-08-12 PROCEDURE — 99283 EMERGENCY DEPT VISIT LOW MDM: CPT

## 2020-08-12 ASSESSMENT — PAIN DESCRIPTION - PAIN TYPE: TYPE: ACUTE PAIN

## 2020-08-12 ASSESSMENT — PAIN DESCRIPTION - LOCATION: LOCATION: LEG

## 2020-08-12 ASSESSMENT — PAIN DESCRIPTION - ORIENTATION: ORIENTATION: RIGHT

## 2020-08-13 ENCOUNTER — APPOINTMENT (OUTPATIENT)
Dept: ULTRASOUND IMAGING | Age: 45
End: 2020-08-13
Payer: COMMERCIAL

## 2020-08-13 VITALS
SYSTOLIC BLOOD PRESSURE: 112 MMHG | HEIGHT: 68 IN | WEIGHT: 140 LBS | DIASTOLIC BLOOD PRESSURE: 75 MMHG | RESPIRATION RATE: 18 BRPM | HEART RATE: 92 BPM | BODY MASS INDEX: 21.22 KG/M2 | OXYGEN SATURATION: 99 % | TEMPERATURE: 97.8 F

## 2020-08-13 PROCEDURE — 6370000000 HC RX 637 (ALT 250 FOR IP): Performed by: PHYSICIAN ASSISTANT

## 2020-08-13 RX ORDER — HYDROXYZINE PAMOATE 25 MG/1
25 CAPSULE ORAL 3 TIMES DAILY PRN
Qty: 10 CAPSULE | Refills: 0 | Status: SHIPPED | OUTPATIENT
Start: 2020-08-13

## 2020-08-13 RX ORDER — IBUPROFEN 800 MG/1
800 TABLET ORAL ONCE
Status: COMPLETED | OUTPATIENT
Start: 2020-08-13 | End: 2020-08-13

## 2020-08-13 RX ORDER — IBUPROFEN 800 MG/1
800 TABLET ORAL EVERY 6 HOURS PRN
Qty: 30 TABLET | Refills: 0 | Status: SHIPPED | OUTPATIENT
Start: 2020-08-13 | End: 2020-10-27

## 2020-08-13 RX ORDER — HYDROXYZINE PAMOATE 25 MG/1
25 CAPSULE ORAL ONCE
Status: COMPLETED | OUTPATIENT
Start: 2020-08-13 | End: 2020-08-13

## 2020-08-13 RX ADMIN — IBUPROFEN 800 MG: 800 TABLET, FILM COATED ORAL at 02:31

## 2020-08-13 RX ADMIN — HYDROXYZINE PAMOATE 25 MG: 25 CAPSULE ORAL at 02:31

## 2020-08-13 ASSESSMENT — PAIN SCALES - GENERAL: PAINLEVEL_OUTOF10: 9
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
## 2020-08-13 NOTE — ED NOTES
During discharge patient requesting to discuss prescriptions with Jones Pena for pain control. Primary nurse notified as well.      Antonietta Cohen RN  08/13/20 2444

## 2020-08-19 ENCOUNTER — HOSPITAL ENCOUNTER (EMERGENCY)
Age: 45
Discharge: HOME | End: 2020-08-19
Attending: EMERGENCY MEDICINE
Payer: COMMERCIAL

## 2020-08-19 VITALS
HEART RATE: 78 BPM | TEMPERATURE: 98.2 F | OXYGEN SATURATION: 100 % | SYSTOLIC BLOOD PRESSURE: 132 MMHG | RESPIRATION RATE: 18 BRPM | DIASTOLIC BLOOD PRESSURE: 69 MMHG

## 2020-08-19 PROCEDURE — 6370000000 HC RX 637 (ALT 250 FOR IP): Performed by: EMERGENCY MEDICINE

## 2020-08-19 PROCEDURE — 99283 EMERGENCY DEPT VISIT LOW MDM: CPT

## 2020-08-19 RX ORDER — ACETAMINOPHEN 500 MG
1000 TABLET ORAL ONCE
Status: COMPLETED | OUTPATIENT
Start: 2020-08-19 | End: 2020-08-19

## 2020-08-19 RX ADMIN — ACETAMINOPHEN 1000 MG: 500 TABLET ORAL at 05:28

## 2020-08-19 ASSESSMENT — PAIN SCALES - GENERAL
PAINLEVEL_OUTOF10: 10
PAINLEVEL_OUTOF10: 0
PAINLEVEL_OUTOF10: 10

## 2020-08-19 ASSESSMENT — PAIN DESCRIPTION - LOCATION: LOCATION: BACK;HIP

## 2020-08-19 NOTE — ED NOTES
Checked on patient in restroom, patient states \" I need some time, Im not registered am I, I still need to do somethings and step outside\". Asked patient if she wanted to be seen and she states \" yes but give me sometime so im not a mess when I get back there\".         Tania Helms, MARTIN  08/19/20 Yassine Mclain RN  08/19/20 3752
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
## 2020-08-19 NOTE — ED PROVIDER NOTES
Triage Chief Complaint:   Leg Pain (bilateral ) and Hip Pain    [place]:  Lindsey Rush is a 41 y.o. female that presents stating that she is hungry, has nowhere to go right now and has chronic back and hip pain. States that this is unchanged but slightly worse because she has been walking a lot recently. She has had this issue for a year and a half. States that she is on Suboxone for it. Denies any motor or sensory deficits, difficulty urinating or defecating, fevers, IV drug use. States that she has used methamphetamines before in the past.  States that she has not taken anything else for the pain. She denies any other complaints. ROS:  At least 10 systems reviewed and otherwise acutely negative except as in the [address] Past Medical History:   Diagnosis Date    Depression     Fibromyalgia     Hepatitis C     Stated by patient     Past Surgical History:   Procedure Laterality Date    HYSTERECTOMY       History reviewed. No pertinent family history.   Social History     Socioeconomic History    Marital status: Unknown     Spouse name: Not on file    Number of children: Not on file    Years of education: Not on file    Highest education level: Not on file   Occupational History    Not on file   Social Needs    Financial resource strain: Not on file    Food insecurity     Worry: Not on file     Inability: Not on file    Transportation needs     Medical: Not on file     Non-medical: Not on file   Tobacco Use    Smoking status: Current Some Day Smoker     Types: Cigarettes     Last attempt to quit: 2020     Years since quittin.3    Smokeless tobacco: Never Used   Substance and Sexual Activity    Alcohol use: Yes     Frequency: Never    Drug use: Yes     Types: Methamphetamines     Comment: recently used but trying to recover    Sexual activity: Yes   Lifestyle    Physical activity     Days per week: Not on file     Minutes per session: Not on file    Stress: Not on file were made to edit the dictations but occasionally words are mis-transcribed.)    MD Jamison Moseley MD  08/19/20 6451

## 2020-08-27 ENCOUNTER — HOSPITAL ENCOUNTER (EMERGENCY)
Age: 45
Discharge: HOME | End: 2020-08-27
Payer: COMMERCIAL

## 2020-08-27 ENCOUNTER — HOSPITAL ENCOUNTER (EMERGENCY)
Age: 45
Discharge: LEFT BEFORE DISCHARGE | End: 2020-08-27
Payer: COMMERCIAL

## 2020-08-27 VITALS
RESPIRATION RATE: 17 BRPM | DIASTOLIC BLOOD PRESSURE: 66 MMHG | WEIGHT: 140 LBS | BODY MASS INDEX: 21.97 KG/M2 | HEIGHT: 67 IN | TEMPERATURE: 98.3 F | HEART RATE: 90 BPM | SYSTOLIC BLOOD PRESSURE: 116 MMHG | OXYGEN SATURATION: 98 %

## 2020-08-27 VITALS
HEART RATE: 103 BPM | SYSTOLIC BLOOD PRESSURE: 146 MMHG | WEIGHT: 140 LBS | HEIGHT: 67 IN | OXYGEN SATURATION: 97 % | DIASTOLIC BLOOD PRESSURE: 100 MMHG | RESPIRATION RATE: 18 BRPM | BODY MASS INDEX: 21.97 KG/M2

## 2020-08-27 PROCEDURE — 99283 EMERGENCY DEPT VISIT LOW MDM: CPT

## 2020-08-27 PROCEDURE — 6360000002 HC RX W HCPCS: Performed by: PHYSICIAN ASSISTANT

## 2020-08-27 PROCEDURE — 96372 THER/PROPH/DIAG INJ SC/IM: CPT

## 2020-08-27 RX ORDER — MUPIROCIN CALCIUM 20 MG/G
CREAM TOPICAL
Qty: 1 TUBE | Refills: 0 | Status: SHIPPED | OUTPATIENT
Start: 2020-08-27 | End: 2020-09-26

## 2020-08-27 RX ORDER — LORAZEPAM 2 MG/ML
1 INJECTION INTRAMUSCULAR ONCE
Status: COMPLETED | OUTPATIENT
Start: 2020-08-27 | End: 2020-08-27

## 2020-08-27 RX ORDER — KETOROLAC TROMETHAMINE 30 MG/ML
30 INJECTION, SOLUTION INTRAMUSCULAR; INTRAVENOUS ONCE
Status: COMPLETED | OUTPATIENT
Start: 2020-08-27 | End: 2020-08-27

## 2020-08-27 RX ADMIN — LORAZEPAM 1 MG: 2 INJECTION INTRAMUSCULAR; INTRAVENOUS at 03:58

## 2020-08-27 RX ADMIN — KETOROLAC TROMETHAMINE 30 MG: 30 INJECTION, SOLUTION INTRAMUSCULAR; INTRAVENOUS at 07:42

## 2020-08-27 ASSESSMENT — PAIN DESCRIPTION - LOCATION
LOCATION: BACK
LOCATION: BACK

## 2020-08-27 ASSESSMENT — PAIN DESCRIPTION - PAIN TYPE
TYPE: ACUTE PAIN
TYPE: ACUTE PAIN

## 2020-08-27 ASSESSMENT — PAIN SCALES - GENERAL
PAINLEVEL_OUTOF10: 8
PAINLEVEL_OUTOF10: 4
PAINLEVEL_OUTOF10: 8

## 2020-08-27 ASSESSMENT — PAIN DESCRIPTION - DESCRIPTORS: DESCRIPTORS: BURNING

## 2020-08-27 NOTE — ED NOTES
Patient remains asleep. No signs of distress.  Will continue to monitor     ISIDRO's, RN  08/27/20 0872

## 2020-08-27 NOTE — ED TRIAGE NOTES
Patient to ER for c/o back pain (states hx of sciatica), rash to genitals, and case management. Patient states she recently had poison ivy. Patient left AMA roughly a half hour ago after being here for back pain and waiting on case management to see her. Prior to her leaving AMA, this RN  Provided her with underwear, socks, and a boxed lunch. Upon arrival to ED for second time, patient tells this RN she was not given a boxed lunch and that she did not leave AMA. Patient was reminded that we informed her she cannot leave the building and come back to her room, that if she left, she was going to be considered leaving AMA, which she did. Patient checks back in, initially refusing to tell this RN the reason for her visit. Once patient was in room, she repeatedly asks for food and drinks, of which this RN tells her that we cannot provide those until a physician has seen her. Also reminded her that she was just given some roughly a half hour ago when she left.  Patient was verbally upset, shut the door, and is stable waiting on a physician

## 2020-08-27 NOTE — ED NOTES
Discharge instructions reviewed with patient. Patient denies further questions and verbalizes understanding. Patient provided with ginger ale on discharge. . security at bedside to assist getting patient out of room, as she refuses to leave.       Mason, RN  08/27/20 0781

## 2020-08-27 NOTE — ED PROVIDER NOTES
eMERGENCY dEPARTMENT eNCOUnter        [facility]    PCP: No primary care provider on file. CHIEF COMPLAINT    Chief Complaint   Patient presents with    Back Pain       HPI    Qian Phillips is a 41 y.o. female who presents with initial chief complaint of lower back pain but is reporting to me right calf pain. Patient is a very poor historian on my exam but per ED nurse, patient initially came to the ED stating that she is hungry and homeless and \"has no where to go. \" Patient is well known to the emergency department, admits to methamphetamine and cocaine use history but denies any recent illicit drug use or IV drug use. She reports chronic right leg/calf pain that begins in the right hip and radiates downward because \"I do a lot of walking since I have no where to go. \" No new fevers/chills. States that she is on suboxone therapy. Denies new trauma to the right leg or recent falls. Denies fevers, new numbness or tingling or weakness in the lower legs. No saddle paresthesia, no bowel incontinence or bladder retention. No SI/HI        REVIEW OF SYSTEMS    Constitutional:  Denies fever, chills, weight loss or weakness. Cardiovascular:  Denies chest pain, palpitations or swelling  Respiratory:  Denies cough or shortness of breath    GI:  Denies abdominal pain, nausea, vomiting, or diarrhea  :  Denies any urinary symptoms or vaginal symptoms. Adamantaly denies pregnancy. Musculoskeletal:  See HPI above   Skin:  Denies rash  Neurologic:   No bowel incontinence or bladder retention, No saddle anesthesia, No radicular symptoms.     Endocrine:  Denies polyuria or polydypsia   Lymphatic:  Denies swollen glands     All other review of systems are negative  See HPI and nursing notes for additional information       PAST MEDICAL & SURGICAL HISTORY    Past Medical History:   Diagnosis Date    Depression     Fibromyalgia 2020    Hepatitis C     Stated by patient     Past Surgical History:   Procedure Laterality Date    HYSTERECTOMY         CURRENT MEDICATIONS    Current Outpatient Rx   Medication Sig Dispense Refill    ibuprofen (ADVIL;MOTRIN) 800 MG tablet Take 1 tablet by mouth every 6 hours as needed for Pain 30 tablet 0    hydrOXYzine (VISTARIL) 25 MG capsule Take 1 capsule by mouth 3 times daily as needed for Anxiety 10 capsule 0    permethrin (ELIMITE) 5 % cream Apply topically as directed 1 Tube 0    gabapentin (NEURONTIN) 300 MG capsule Take 1 capsule by mouth 2 times daily for 14 days. 28 capsule 0    methocarbamol (ROBAXIN) 500 MG tablet Take 1 tablet by mouth 4 times daily As needed for muscle spasm. 20 tablet 0    naproxen (NAPROSYN) 500 MG tablet Take 1 tablet by mouth 2 times daily as needed for Pain 30 tablet 0    fluticasone (FLONASE) 50 MCG/ACT nasal spray 1 spray by Nasal route 2 times daily 1 Bottle 0    DULoxetine (CYMBALTA) 30 MG extended release capsule Take 30 mg by mouth 2 times daily      DULoxetine (CYMBALTA) 60 MG extended release capsule Take 60 mg by mouth 2 times daily      cloNIDine (CATAPRES) 0.1 MG tablet Take 0.1 mg by mouth         ALLERGIES    Allergies   Allergen Reactions    Ceclor [Cefaclor] Hives    Haldol [Haloperidol Lactate] Swelling     Stated that tongue swelled with haldol       SOCIAL HISTORY    Social History     Socioeconomic History    Marital status: Unknown     Spouse name: None    Number of children: None    Years of education: None    Highest education level: None   Occupational History    None   Social Needs    Financial resource strain: None    Food insecurity     Worry: None     Inability: None    Transportation needs     Medical: None     Non-medical: None   Tobacco Use    Smoking status: Current Some Day Smoker     Types: Cigarettes     Last attempt to quit: 2020     Years since quittin.3    Smokeless tobacco: Never Used   Substance and Sexual Activity    Alcohol use:  Yes Frequency: Never    Drug use: Yes     Types: Methamphetamines     Comment: recently used but trying to recover    Sexual activity: Yes   Lifestyle    Physical activity     Days per week: None     Minutes per session: None    Stress: None   Relationships    Social connections     Talks on phone: None     Gets together: None     Attends Zoroastrianism service: None     Active member of club or organization: None     Attends meetings of clubs or organizations: None     Relationship status: None    Intimate partner violence     Fear of current or ex partner: None     Emotionally abused: None     Physically abused: None     Forced sexual activity: None   Other Topics Concern    None   Social History Narrative    None       PHYSICAL EXAM    VITAL SIGNS: /62   Pulse 104   Temp 98 °F (36.7 °C) (Oral)   Resp 15   Ht 5' 7\" (1.702 m)   Wt 140 lb (63.5 kg)   SpO2 97%   BMI 21.93 kg/m²    Patient was noted to be afebrile    Constitutional:  Well developed, thin female. In no acute distress. She is noted to be laying in bed without pants or underwear and scratching her buttocks and inner thighs frequently throughout exam   Head:  Atraumatic,  Normocephalic  Eyes:  No scleral icterus. Conjuctiva clear, No discharge  Neck: No JVD, supple. No enlarged lymph nodes. Trachea midline  Respiratory: Respirations nonlabored  Abdomen: Bowel sounds normal, Soft, No tenderness, no masses. Musculoskeletal:  No edema, no deformities. Right lower leg: No gross bony deformities in the right lower leg. Compartments are soft throughout. Calves are supple and symmetrical.  No pretibial pitting edema. Spontaneously moving her right hip, knee and ankle with equal strength and range of motion. Pedal pulse 2+. Brisk capillary refill. Back:   - No gross deformity, swelling, or discoloration.    - No focal paralumbar muscle tenderness without masses, fluctuance, warmth, or skin changes.  - No localized midline bony tenderness. No palpable step off, creptitus  - No change in pain with forward flexion  - SLR test negative bilaterally  - No CVA tenderness to percussion  Integument:  Well hydrated, no rash, no pallor. No obvious vesicular or urtical rash to the medial thighs/buttocks but there arell multiple old appearing scabbed lesions without redness or purulence in these areas. +excoriations. No bleeding lesions  Neurologic:    - No obvious neurological deficits. Patient is ambulatory, favoring the right lower leg but no drop foot  - Motor & Sensation intact bilateral lower extremities. - 5/5 strength with dorsi/plantar flexion with resistance  - Patella and Achilles reflexes 2+ bilaterally  - BLE ROM intact with 5/5 strength   - No Drop foot  Vascular:  Distal pulses and capillary refill intact bilateral lower extremities      RADIOLOGY/PROCEDURES    NONE    ED COURSE & MEDICAL DECISION MAKING       Vital signs and nursing notes reviewed during ED course. I have independently evaluated this patient . Supervising MD - Dr. Hyun Garza - present in the Emergency Department, available for consultation, throughout entirety of  patient care. All pertinent Lab data and radiographic results reviewed with patient at bedside. The patient and/or the family were informed of the results of any tests/labs/imaging, the treatment plan, and time was allotted to answer questions. Differential Diagnosis: Epidural Abscess, Abdominal Aortic Aneurysm, Metastases to back, Cauda Equina Syndrome, Kidney stone, Pyelonephritis, other    Clinical  IMPRESSION    1. Eloped from emergency department    2. Chronic pain of right lower extremity        Patient presents via EMS with initial chief complaint of lower back pain but reports to me acute on chronic right leg pain without new injury. Workup was initiated at triage and she was noted to be hyperactive/agitated per triage and did receive a dose of IM ativan prior to my evaluation.  On my exam, she

## 2020-08-27 NOTE — ED NOTES
Bed: ED-14  Expected date:   Expected time:   Means of arrival:   Comments:  EMS     Shaina Castañeda RN  08/27/20 8774

## 2020-08-27 NOTE — ED NOTES
Angelica Rodriguez at bedside. .. patient unable to remain alert to speak with him. Patient appears very drowsy, remains stable.  Angelica Rodriguez to come back to speak with patient at a later time     MARTIN Cuevas  08/27/20 5002

## 2020-08-27 NOTE — ED NOTES
The patient came out of room 14 with the empty freezer meal box and requests another one. The patient was given 4 freezer meals according the night shift nurse. The patient is now wearing a pair of muriel shorts that are hers. This nurse offers the patient a hot breakfast. She is angry at how long that may take. This nurse explains that a  will be coming later to talk with her and assist her. She became very angry and reminded this nurse that she still needs underwear. Migdalia SALAZAR went to get underwear and a boxed lunch for the patient. This nurse stated that we could also get another freezer meal, just thought she may want something different. The patient went back in to room 14.       Deanna Mcgowan RN  08/27/20 9201

## 2020-08-27 NOTE — CARE COORDINATION
Pavan Warren. Pt explains Jesusa Seip was her  name. Pt does have insurance and can afford her medications. Pt has transportation via insurance. Pt does not have a PCP. Pt did report she receives suboxone via [facility]. [id number] called [facility] and spoke to [address] [place] [address]. Reviewed pt's case and she noted she would need to complete intake. LSW brought portable phone to pt and pt having blood being drawn and LSW asks pt to speak to [employer]. Initially, pt does not want to talk and wants LSW to put phone on speaker and is falling asleep. LSW notes if pt is interested in help, she needs to talk to [employer] and pt reluctantly took phone. However, pt continues to fall asleep and Migdalia seemed to ask pt questions several times and pt became mad @ Migdalia and states she is listening. Pt ended up giving phone back to LSW and LSW spoke to [employer]. Migdalia notes she is familiar w/pt as she was at [facility] in May for 5 days but checked herself out as she wanted to be w/her SO. Pt can be very abrasive, stubborn and \"demanding\". Migdalia explains [facility] can possibly assist pt again but she will need to call her to complete intake visa phone. [facility] called [address] and was informed that unable to check to see if voucher was issued to pt. Only able to call Hany zavala Fri 10;30 - noon. LSW then called [facility] and spoke to Ronen Hunt. After being on hold for some time, Ronen Hunt explained pt is approved for voucher and can go to [place] [id number]. Pt needs to show picture ID when she arrives and then call [phone] when she gets in room. If not ID, cannot assist. Pt.     36  LSW to pt's room and pt sleeping. It was somewhat difficult to arose pt and she kept falling asleep. LSW raised voice to arouse pt and she asks LSW to quit screaming. LSW informed her that she needs to wake up as [facility] is willing to assist her but she needs to have ID.  LSW asks for ID multiple times and finally pt sits up and starts looking but never could find her ID. Pt states \"I have to argue with everyone\" and LSW notes no one is arguing w/pt and just trying to assist her. Pt then states LSW had her ID and continues not to be able to find it. LSW notes cannot help her if she does not have ID and she will need d/c'd. Pt states that is fine. LSW did look in media tab and unable to find copy of pt's ID.    1300  LSW apprised Harley of above information and POC is for d/c of pt. LSW notified Gato of POC. Both met w/pt and again asked pt if she has ID. Pt did dump her purse onto bed and there is no ID. Pt to look a little longer and then d/c.    1313  Pt was d/c'd and security at bedside to assist getting patient out of room, as she refuses to leave. [facility] called [facility] and spoke to Dannielle. Informed her that pt could not find ID and left ED so will not be going to [place] [id number].

## 2020-08-27 NOTE — ED NOTES
The patient has been given a boxed lunch, socks, and underwear. She is wearing shorts and carrying her purse when she begins walking around the ED. This nurse directs the patient back to room 14, again. The patient refuses to go to the room and walks out to the waiting room.       Maliha Gray RN  08/27/20 5543

## 2020-08-27 NOTE — ED PROVIDER NOTES
EMERGENCY DEPARTMENT ENCOUNTER      PCP: No primary care provider on file. CHIEF COMPLAINT    Chief Complaint   Patient presents with    Other     see note       I evaluated this patient. My supervising physician was available for consultation. HPI    Calin Fajardo is a 41 y.o. female who presents with concern for poison oak and hemorrhoids. She was seen here this morning for similar complaints and left AMA after eating a meal. She says she has a rash on her buttocks and groin area that itches. She doesn't know how long it has been there. She has tried topical benadryl which helps but then the itching recurs. She is requesting to see case management because she is homeless. I asked why she left AMA without seeing case management and she says \"I did not leave, I just went out to the bench and ate a sandwich. \"  She then asked me if she can have some food because she \"has not eaten in 3 days\". She then asked me if she can be transferred to another hospital.  She denies any other symptoms besides the itchy rash. She is not having any GI upset, nausea vomiting or diarrhea or abdominal pain, shortness of breath, facial swelling, chest pain.       REVIEW OF SYSTEMS    Constitutional:  Denies fever, chills, weight loss or weakness   HENT:  Denies sore throat or ear pain   Cardiovascular:  Denies chest pain, palpitations   Respiratory:  Denies cough or shortness of breath    GI:  Denies abdominal pain, nausea, vomiting, or diarrhea  :  Denies any urinary symptoms   Musculoskeletal:  Denies back pain  Skin:  See hpi  Neurologic:  Denies headache, focal weakness or sensory changes   Endocrine:  Denies polyuria or polydypsia   Lymphatic:  Denies swollen glands     All other review of systems are negative  See HPI and nursing notes for additional information     PAST MEDICAL AND SURGICAL HISTORY    Past Medical History:   Diagnosis Date    Depression     Fibromyalgia 2020    Hepatitis C     Stated by patient     Past Surgical History:   Procedure Laterality Date    HYSTERECTOMY         CURRENT MEDICATIONS    Current Outpatient Rx   Medication Sig Dispense Refill    mupirocin (BACTROBAN) 2 % cream Apply topically 3 times daily. 1 Tube 0    ibuprofen (ADVIL;MOTRIN) 800 MG tablet Take 1 tablet by mouth every 6 hours as needed for Pain 30 tablet 0    hydrOXYzine (VISTARIL) 25 MG capsule Take 1 capsule by mouth 3 times daily as needed for Anxiety 10 capsule 0    permethrin (ELIMITE) 5 % cream Apply topically as directed 1 Tube 0    gabapentin (NEURONTIN) 300 MG capsule Take 1 capsule by mouth 2 times daily for 14 days. 28 capsule 0    methocarbamol (ROBAXIN) 500 MG tablet Take 1 tablet by mouth 4 times daily As needed for muscle spasm.  20 tablet 0    naproxen (NAPROSYN) 500 MG tablet Take 1 tablet by mouth 2 times daily as needed for Pain 30 tablet 0    fluticasone (FLONASE) 50 MCG/ACT nasal spray 1 spray by Nasal route 2 times daily 1 Bottle 0    DULoxetine (CYMBALTA) 30 MG extended release capsule Take 30 mg by mouth 2 times daily      DULoxetine (CYMBALTA) 60 MG extended release capsule Take 60 mg by mouth 2 times daily      cloNIDine (CATAPRES) 0.1 MG tablet Take 0.1 mg by mouth         ALLERGIES    Allergies   Allergen Reactions    Ceclor [Cefaclor] Hives    Haldol [Haloperidol Lactate] Swelling     Stated that tongue swelled with haldol       SOCIAL AND FAMILY HISTORY    Social History     Socioeconomic History    Marital status: Unknown     Spouse name: None    Number of children: None    Years of education: None    Highest education level: None   Occupational History    None   Social Needs    Financial resource strain: None    Food insecurity     Worry: None     Inability: None    Transportation needs     Medical: None     Non-medical: None   Tobacco Use    Smoking status: Current Some Day Smoker     Types: Cigarettes     Last attempt to quit: 2020     Years since quittin.3    grossly intact. Normal gross motor coordination & motor strength bilateral upper and lower extremities  Sensation intact. Psychiatric:  Affect normal, Mood normal.       ED COURSE & MEDICAL DECISION MAKING       Patient presents as above with concern for rash in her perineum and bilateral buttocks. Do not believe it represents poison oak, I favor folliculitis. Vital signs are normal except for mildly elevated blood pressure. Case management consulted. I spoke with case management, they have a place for the patient but she does not have her ID. Case management trying to find a copy of it in her records but were unsuccessful. Plan is to discharge the patient with Bactroban cream.  I recommend that she follow-up with [facility] to establish care with a primary doctor. Plan reviewed with the patient, she agrees and understands. Time allotted to answer questions and return precautions reviewed at length. She agrees to return to emergency department if any new or worsening symptoms. Vital signs and nursing notes reviewed during ED course. Clinical  IMPRESSION    1. Folliculitis of perineum          Comment: Please note this report has been produced using speech recognition software and may contain errors related to that system including errors in grammar, punctuation, and spelling, as well as words and phrases that may be inappropriate. If there are any questions or concerns please feel free to contact the dictating provider for clarification.          [place], [place]  08/27/20 [address] [place] [address]  09/02/20 [address] [place], [place]  09/04/20 [address] [place] [address]  51/21/75 7683

## 2020-08-27 NOTE — ED NOTES
The patient is no longer in the ED or waiting room, and is presumed to have left the building.       Evens Ramirez RN  08/27/20 4667

## 2020-08-27 NOTE — ED PROVIDER NOTES
As provider-in-triage, I performed a medical screening history and physical exam on this patient. HISTORY OF PRESENT ILLNESS  Calin Speaker is a 41 y.o. female today complaining of lower back pain. Back pain is chronic in nature. 2/10. Patient requests food. No saddle anesthesia bowel bladder incontinence or retention. GENERAL APPEARANCE: Awake and alert. Cooperative. No acute distress. HEAD: Normocephalic. Atraumatic. EYES: EOM's grossly intact. Sclera anicteric. ENT: Mucous membranes are moist. Tolerates saliva. No trismus. NECK: Supple. No meningismus. Trachea midline. HEART: RRR. Radial pulses 2+. LUNGS: Respirations unlabored. CTAB  ABDOMEN: Soft. Non-tender. No guarding or rebound. EXTREMITIES: No acute deformities. SKIN: Warm and dry. NEUROLOGICAL: No gross facial drooping. Moves all 4 extremities spontaneously. PSYCHIATRIC: Patient's mood, hyperactive. PHYSICAL EXAM  /62   Pulse 104   Temp 98 °F (36.7 °C) (Oral)   Resp 15   Ht 5' 7\" (1.702 m)   Wt 140 lb (63.5 kg)   SpO2 97%   BMI 21.93 kg/m²     On exam, the patient appears well-hydrated, well-nourished, and in no acute distress. Mucous membranes are moist. Speech is clear. Breathing is unlabored. Skin is dry. Mental status is normal. Moves all extremities, and is without facial droop. Comment: Please note this report has been produced using speech recognition software and may contain errors related to that system including errors in grammar, punctuation, and spelling, as well as words and phrases that may be inappropriate. If there are any questions or concerns please feel free to contact the dictating provider for clarification.         Vannesa Minaya PA-C  08/27/20 8559

## 2020-08-28 ENCOUNTER — HOSPITAL ENCOUNTER (EMERGENCY)
Age: 45
Discharge: HOME | End: 2020-08-28
Attending: EMERGENCY MEDICINE
Payer: COMMERCIAL

## 2020-08-28 VITALS
RESPIRATION RATE: 18 BRPM | BODY MASS INDEX: 21.97 KG/M2 | OXYGEN SATURATION: 100 % | SYSTOLIC BLOOD PRESSURE: 112 MMHG | WEIGHT: 140 LBS | TEMPERATURE: 98 F | DIASTOLIC BLOOD PRESSURE: 73 MMHG | HEART RATE: 90 BPM | HEIGHT: 67 IN

## 2020-08-28 LAB
BACTERIA: NEGATIVE /HPF
BILIRUBIN URINE: NEGATIVE MG/DL
BLOOD, URINE: NEGATIVE
CLARITY: CLEAR
COLOR: YELLOW
GLUCOSE, URINE: NEGATIVE MG/DL
KETONES, URINE: NEGATIVE MG/DL
LEUKOCYTE ESTERASE, URINE: NEGATIVE
Lab: ABNORMAL
MUCUS: ABNORMAL HPF
NITRITE URINE, QUANTITATIVE: NEGATIVE
PH, URINE: 8 (ref 5–8)
PROTEIN UA: NEGATIVE MG/DL
RBC URINE: 1 /HPF (ref 0–6)
SPECIFIC GRAVITY UA: 1 (ref 1–1.03)
SPECIMEN: ABNORMAL
SQUAMOUS EPITHELIAL: 2 /HPF
UROBILINOGEN, URINE: <2 MG/DL (ref 0.2–1)
WBC UA: 1 /HPF (ref 0–5)
WET PREP: ABNORMAL

## 2020-08-28 PROCEDURE — 87491 CHLMYD TRACH DNA AMP PROBE: CPT

## 2020-08-28 PROCEDURE — 87210 SMEAR WET MOUNT SALINE/INK: CPT

## 2020-08-28 PROCEDURE — 99283 EMERGENCY DEPT VISIT LOW MDM: CPT

## 2020-08-28 PROCEDURE — 81001 URINALYSIS AUTO W/SCOPE: CPT

## 2020-08-28 PROCEDURE — 87591 N.GONORRHOEAE DNA AMP PROB: CPT

## 2020-08-28 PROCEDURE — 6370000000 HC RX 637 (ALT 250 FOR IP): Performed by: EMERGENCY MEDICINE

## 2020-08-28 RX ORDER — METRONIDAZOLE 250 MG/1
2000 TABLET ORAL ONCE
Status: COMPLETED | OUTPATIENT
Start: 2020-08-28 | End: 2020-08-28

## 2020-08-28 RX ORDER — CLINDAMYCIN PHOSPHATE 10 MG/G
GEL TOPICAL
Qty: 1 TUBE | Refills: 0 | Status: SHIPPED | OUTPATIENT
Start: 2020-08-28 | End: 2020-09-04

## 2020-08-28 RX ORDER — CLINDAMYCIN PHOSPHATE 10 MG/G
GEL TOPICAL 2 TIMES DAILY
Status: DISCONTINUED | OUTPATIENT
Start: 2020-08-28 | End: 2020-08-28 | Stop reason: HOSPADM

## 2020-08-28 RX ORDER — NAPROXEN 500 MG/1
500 TABLET ORAL 2 TIMES DAILY
Qty: 60 TABLET | Refills: 0 | Status: SHIPPED | OUTPATIENT
Start: 2020-08-28

## 2020-08-28 RX ORDER — GABAPENTIN 400 MG/1
800 CAPSULE ORAL ONCE
Status: COMPLETED | OUTPATIENT
Start: 2020-08-28 | End: 2020-08-28

## 2020-08-28 RX ORDER — DIPHENHYDRAMINE HCL 25 MG
25 CAPSULE ORAL EVERY 6 HOURS PRN
Qty: 30 CAPSULE | Refills: 0 | Status: SHIPPED | OUTPATIENT
Start: 2020-08-28 | End: 2020-09-07

## 2020-08-28 RX ORDER — COCOA BUTTER, PHENYLEPHRINE HYDROCHLORIDE 2211; 6.25 MG/1; MG/1
1 SUPPOSITORY RECTAL PRN
Qty: 30 SUPPOSITORY | Refills: 0 | Status: SHIPPED | OUTPATIENT
Start: 2020-08-28

## 2020-08-28 RX ORDER — ACETAMINOPHEN 325 MG/1
650 TABLET ORAL EVERY 6 HOURS PRN
Qty: 120 TABLET | Refills: 3 | Status: SHIPPED | OUTPATIENT
Start: 2020-08-28

## 2020-08-28 RX ORDER — NAPROXEN 250 MG/1
500 TABLET ORAL ONCE
Status: COMPLETED | OUTPATIENT
Start: 2020-08-28 | End: 2020-08-28

## 2020-08-28 RX ORDER — AZITHROMYCIN 250 MG/1
2000 TABLET, FILM COATED ORAL ONCE
Status: COMPLETED | OUTPATIENT
Start: 2020-08-28 | End: 2020-08-28

## 2020-08-28 RX ORDER — DIPHENHYDRAMINE HCL 25 MG
25 TABLET ORAL ONCE
Status: COMPLETED | OUTPATIENT
Start: 2020-08-28 | End: 2020-08-28

## 2020-08-28 RX ORDER — PERMETHRIN 50 MG/G
CREAM TOPICAL
Qty: 1 TUBE | Refills: 0 | Status: SHIPPED | OUTPATIENT
Start: 2020-08-28

## 2020-08-28 RX ADMIN — DIPHENHYDRAMINE HYDROCHLORIDE 25 MG: 25 TABLET ORAL at 04:18

## 2020-08-28 RX ADMIN — NAPROXEN 500 MG: 250 TABLET ORAL at 04:18

## 2020-08-28 RX ADMIN — METRONIDAZOLE 2000 MG: 250 TABLET, FILM COATED ORAL at 05:15

## 2020-08-28 RX ADMIN — AZITHROMYCIN MONOHYDRATE 2000 MG: 250 TABLET ORAL at 05:14

## 2020-08-28 RX ADMIN — CLINDAMYCIN PHOSPHATE: 10 GEL TOPICAL at 05:15

## 2020-08-28 RX ADMIN — GABAPENTIN 800 MG: 400 CAPSULE ORAL at 05:15

## 2020-08-28 ASSESSMENT — ENCOUNTER SYMPTOMS
EYES NEGATIVE: 1
ALLERGIC/IMMUNOLOGIC NEGATIVE: 1
RECTAL PAIN: 1
RESPIRATORY NEGATIVE: 1

## 2020-08-28 ASSESSMENT — PAIN SCALES - GENERAL: PAINLEVEL_OUTOF10: 9

## 2020-08-28 NOTE — ED NOTES
After patient discharge she was found out in the hospital parking lot by the patient advocate. Per patient advocate the patient was not able to carry on a conversation and seemed confused with concerns that something was going to happen to her. This nurse with security went out and spoke with patient; patient at this time was alert and oriented and was able to walk back to the ED without any assistance. The patient was provided with underwear and a pepsi. This nurse asked patient if she would like to be seen again and she replied \"no, I'm going to smoke a cigarette first and then I will let you know. \". After smoking her cigarette she notified ED staff a  that she was going to [facility] and might come back later.  Laurence Mariscal notified of the conversations and actions that took place in case the need for further follow up is needed.       Estevan Tracey RN  08/28/20 1016

## 2020-08-28 NOTE — CARE COORDINATION
12  LSW spoke to [facility] charge about this pt. Reviewed pt's case and that pt may return to ED. LSW spoke to pt yesterday (see note). Pt seems to be exhibiting the same behaviors and this likely to be her baseline. LSW can follow-up w/ [facility] to explore this as option for pt. as she is homeless. They do not accept calls till 10:30.     1057  LSW called [address] and spoke to Yaneth. She explained pt has not showed up there today, actually no one has showed up for a one day voucher. They are 1st come 1st serve and they usually only give out 2 a day. They do not \"hold \" these for anyone. Pt can also secure assistance for ID if she shows up. Assistance for ID's is given again to 1st come 1st serve and based on resources available. Pt can arrive as early as 10am but as of 12:30- none are given.

## 2020-08-28 NOTE — ED NOTES
Reviewed discharge information. Patient verbalized understanding and denied any questions.      Mercy Hoffman RN  08/28/20 7842

## 2020-08-28 NOTE — ED NOTES
Pt complains about wheelchair. Pt requests for warm blanket. Currently pt is sound asleep in the lobby. No distress noted.       Marcy Saucedo RN  08/28/20 5484

## 2020-08-28 NOTE — ED PROVIDER NOTES
[facility]      TRIAGE CHIEF COMPLAINT:   Back Pain and Rash      Knik:  Alesia Paez is a 41 y.o. female that presents with complaint of mainly rash, vaginal discharge. Patient states she is homeless she is been here multiple times recently for similar complaint she has a history of sciatica she is not got her prescription filled she states she has chronic sciatica also states she has a rash to her genital, thigh area it itches denies any fevers nausea vomiting chest pain shortness of breath abdominal pain is concerned of possible disease would like treated. Not sure she has a yeast infection also concerned she has a hemorrhoid. REVIEW OF SYSTEMS:  At least 10 systems reviewed and otherwise acutely negative except as in the [address] Review of Systems   Constitutional: Negative. HENT: Negative. Eyes: Negative. Respiratory: Negative. Cardiovascular: Negative. Gastrointestinal: Positive for rectal pain. Endocrine: Negative. Genitourinary: Positive for vaginal discharge and vaginal pain. Musculoskeletal: Positive for myalgias. Skin: Positive for rash. Allergic/Immunologic: Negative. Neurological: Negative. Hematological: Negative. Psychiatric/Behavioral: Negative. All other systems reviewed and are negative. Past Medical History:   Diagnosis Date    Depression     Fibromyalgia 2020    Hepatitis C     Stated by patient     Past Surgical History:   Procedure Laterality Date    HYSTERECTOMY       No family history on file.   Social History     Socioeconomic History    Marital status: Unknown     Spouse name: Not on file    Number of children: Not on file    Years of education: Not on file    Highest education level: Not on file   Occupational History    Not on file   Social Needs    Financial resource strain: Not on file    Food insecurity     Worry: Not on file     Inability: Not on file    Transportation needs     Medical: Not on file tablet Take 1 tablet by mouth 2 times daily as needed for Pain 30 tablet 0    fluticasone (FLONASE) 50 MCG/ACT nasal spray 1 spray by Nasal route 2 times daily 1 Bottle 0    DULoxetine (CYMBALTA) 30 MG extended release capsule Take 30 mg by mouth 2 times daily      DULoxetine (CYMBALTA) 60 MG extended release capsule Take 60 mg by mouth 2 times daily      cloNIDine (CATAPRES) 0.1 MG tablet Take 0.1 mg by mouth        Allergies   Allergen Reactions    Ceclor [Cefaclor] Hives    Haldol [Haloperidol Lactate] Swelling     Stated that tongue swelled with haldol     Current Facility-Administered Medications   Medication Dose Route Frequency Provider Last Rate Last Dose    clindamycin (CLINDAGEL) 1 % gel   Topical BID Karyn Lagunas DO         Current Outpatient Medications   Medication Sig Dispense Refill    mupirocin (BACTROBAN) 2 % cream Apply topically 3 times daily. 1 Tube 0    ibuprofen (ADVIL;MOTRIN) 800 MG tablet Take 1 tablet by mouth every 6 hours as needed for Pain 30 tablet 0    hydrOXYzine (VISTARIL) 25 MG capsule Take 1 capsule by mouth 3 times daily as needed for Anxiety 10 capsule 0    permethrin (ELIMITE) 5 % cream Apply topically as directed 1 Tube 0    gabapentin (NEURONTIN) 300 MG capsule Take 1 capsule by mouth 2 times daily for 14 days. 28 capsule 0    methocarbamol (ROBAXIN) 500 MG tablet Take 1 tablet by mouth 4 times daily As needed for muscle spasm.  20 tablet 0    naproxen (NAPROSYN) 500 MG tablet Take 1 tablet by mouth 2 times daily as needed for Pain 30 tablet 0    fluticasone (FLONASE) 50 MCG/ACT nasal spray 1 spray by Nasal route 2 times daily 1 Bottle 0    DULoxetine (CYMBALTA) 30 MG extended release capsule Take 30 mg by mouth 2 times daily      DULoxetine (CYMBALTA) 60 MG extended release capsule Take 60 mg by mouth 2 times daily      cloNIDine (CATAPRES) 0.1 MG tablet Take 0.1 mg by mouth         Nursing Notes Reviewed    VITAL SIGNS:  ED Triage Vitals [08/28/20 8762] Enc Vitals Group      /73      Pulse 90      Resp 18      Temp 98 °F (36.7 °C)      Temp Source Oral      SpO2 100 %      Weight 140 lb (63.5 kg)      Height 5' 7\" (1.702 m)      Head Circumference       Peak Flow       Pain Score       Pain Loc       Pain Edu? Excl. in [address]? PHYSICAL EXAM:  Physical Exam  Vitals signs and nursing note reviewed. Exam conducted with a chaperone present. Constitutional:       General: She is not in acute distress. Appearance: Normal appearance. She is well-developed and well-groomed. She is not ill-appearing, toxic-appearing or diaphoretic. HENT:      Head: Normocephalic and atraumatic. Right Ear: External ear normal.      Left Ear: External ear normal.      Nose: No congestion or rhinorrhea. Mouth/Throat:      Pharynx: No oropharyngeal exudate or posterior oropharyngeal erythema. Eyes:      General: No scleral icterus. Right eye: No discharge. Left eye: No discharge. Extraocular Movements: Extraocular movements intact. Conjunctiva/sclera: Conjunctivae normal.      Pupils: Pupils are equal, round, and reactive to light. Neck:      Musculoskeletal: Full passive range of motion without pain and normal range of motion. Normal range of motion. No edema, erythema, neck rigidity, crepitus, injury or pain with movement. Vascular: No JVD. Trachea: Phonation normal.   Cardiovascular:      Rate and Rhythm: Normal rate and regular rhythm. Pulses: Normal pulses. Heart sounds: Normal heart sounds. No murmur. No friction rub. No gallop. Pulmonary:      Effort: Pulmonary effort is normal. No respiratory distress. Breath sounds: Normal breath sounds. No stridor. No wheezing, rhonchi or rales. Abdominal:      General: Bowel sounds are normal. There is no distension. Palpations: Abdomen is soft. There is no mass. Tenderness: There is no abdominal tenderness. There is no guarding or rebound. Negative signs include Castro's sign, Rovsing's sign and McBurney's sign. Hernia: No hernia is present. There is no hernia in the left inguinal area or right inguinal area. Genitourinary:     Exam position: Supine. Pubic Area: Rash present. Labia:         Right: No injury. Left: No injury. Vagina: No foreign body. Vaginal discharge present. No erythema, tenderness, bleeding, lesions or prolapsed vaginal walls. Cervix: Normal.      Rectum: Normal. No anal fissure or external hemorrhoid. Normal anal tone. Comments: White vaginal discharge, no foreign body or bleeding, has macular papular rash to mons pubis and groin, appears like bug bites or folliculitis, no pustules or vesicles, no purulence, no candida, no crepitus. Musculoskeletal: Normal range of motion. General: No swelling, tenderness, deformity or signs of injury. Right lower leg: No edema. Left lower leg: No edema. Skin:     General: Skin is warm. Coloration: Skin is not jaundiced or pale. Findings: Rash present. No abscess, bruising, burn, ecchymosis, erythema, signs of injury, laceration, lesion, petechiae or wound. Comments: Macular papular rash to mons pubis, back, thighs appears either like bug bites, scabies, folliculitis she does shave her genital area there are some lesions here does not appear like herpes or warts or other emergent etiology no palm or sole involvement no airway involvement does not appear like syphilis   Neurological:      General: No focal deficit present. Mental Status: She is alert and oriented to person, place, and time. GCS: GCS eye subscore is 4. GCS verbal subscore is 5. GCS motor subscore is 6. Cranial Nerves: Cranial nerves are intact. No cranial nerve deficit, dysarthria or facial asymmetry. Sensory: Sensation is intact. No sensory deficit. Motor: Motor function is intact.  No weakness, tremor, atrophy, abnormal not see any hemorrhoid on physical exam her rectal tone is normal no bleeding there is no foreign body in her vaginal vault she has had a hysterectomy she states. Does have white discharge we will do a wet prep, GC we will give her a azithromycin and clindamycin topical cream for possible folliculitis as she does shave her pubic area. Her rash appears like he did for colitis or scabies or bug bites. Is concern also about bedbugs or scabies I will give her permethrin to go home with. She has had a hysterectomy so she is not pregnant I do not think she needs labs or additional imaging she has no cauda equina no saddle anesthesia no bowel bladder incontinence. I do not think she has syphilis or herpes or AIDS or Lyme disease or other emergent pathology such as Benoit-Cisco's or toxic shock syndrome or Kawasaki's or toxic epidermal neck lysis. Patient be discharged home given return precautions follow-up information I gave her 2 g of azithromycin as well as Flagyl due to penicillin, cephalosporin allergy. Patient awaiting urinalysis, wet prep. I will treat her for possible scabies, folliculitis awaiting urinalysis and wet prep. I did sign outpatient to Dr. Eduardo Garcia. Patient otherwise stable I do not have any suspicion for emergent pathology at this time    CLINICAL IMPRESSION:  Final diagnoses:   Rash and other nonspecific skin eruption   Folliculitis   Concern about STD in female without diagnosis   Homeless   Vaginal discharge       (Please note that portions of this note may have been completed with a voice recognition program. Efforts were made to edit the dictations but occasionally words aremis-transcribed.)    DISPOSITION REFERRAL (if applicable):  No follow-up provider specified.     DISPOSITION MEDICATIONS (if applicable):  New Prescriptions    No medications on file          Nnamdi Bar, [facility],   08/28/20 1262

## 2020-08-28 NOTE — ED TRIAGE NOTES
Pt arrives highly intoxicated, eating an entire meal from [employer] and not taking a break to answer triage questions. Pt continues to eat and states its the same thing that's always wrong. Pt states she cannot walk due to sciatic nerve pain and patient points to her genitals and states she has poison ivy \"down there\".

## 2020-08-28 NOTE — ED PROVIDER NOTES
Geovanna Angel:    Patient dorsal me pending urinalysis. Urinalysis is negative for urinary tract infection. Additionally, wet prep resulted and is negative for acute process. Patient will be discharged home per prior provider plan.     Chuyita Marrero MD  08/28/20 9439

## 2020-08-30 LAB
CHLAMYDIA TRACHOMATIS AMPLIFIED DET: NEGATIVE
N GONORRHOEAE AMPLIFIED DET: NEGATIVE

## 2020-09-02 ENCOUNTER — HOSPITAL ENCOUNTER (EMERGENCY)
Age: 45
Discharge: LEFT BEFORE DISCHARGE | End: 2020-09-02
Payer: COMMERCIAL

## 2020-09-02 VITALS
RESPIRATION RATE: 17 BRPM | DIASTOLIC BLOOD PRESSURE: 71 MMHG | BODY MASS INDEX: 21.97 KG/M2 | TEMPERATURE: 98.3 F | HEIGHT: 67 IN | WEIGHT: 140 LBS | OXYGEN SATURATION: 99 % | HEART RATE: 80 BPM | SYSTOLIC BLOOD PRESSURE: 107 MMHG

## 2020-09-02 PROCEDURE — 4500000002 HC ER NO CHARGE

## 2020-09-02 ASSESSMENT — PAIN DESCRIPTION - DESCRIPTORS: DESCRIPTORS: ACHING

## 2020-09-02 ASSESSMENT — PAIN DESCRIPTION - FREQUENCY: FREQUENCY: CONTINUOUS

## 2020-09-02 ASSESSMENT — PAIN SCALES - GENERAL: PAINLEVEL_OUTOF10: 9

## 2020-09-02 ASSESSMENT — PAIN DESCRIPTION - PAIN TYPE: TYPE: ACUTE PAIN

## 2020-09-02 ASSESSMENT — PAIN DESCRIPTION - LOCATION: LOCATION: LEG

## 2020-09-02 ASSESSMENT — PAIN DESCRIPTION - DIRECTION: RADIATING_TOWARDS: KNEE

## 2020-09-02 ASSESSMENT — PAIN DESCRIPTION - ONSET: ONSET: ON-GOING

## 2020-09-02 ASSESSMENT — PAIN DESCRIPTION - ORIENTATION: ORIENTATION: LEFT

## 2020-09-02 ASSESSMENT — PAIN DESCRIPTION - PROGRESSION: CLINICAL_PROGRESSION: GRADUALLY WORSENING

## 2020-09-02 NOTE — ED NOTES
When asking pt when symptoms started and why needing to be seen. Pt walked away from desk stating she just needs a minute. Pt refused to answer any further questions.       Fariba Box RN  09/02/20 1930

## 2020-09-02 NOTE — ED TRIAGE NOTES
Pt states left leg pain, worsening sciatica pain. Pt states also needs CM for housing concerns.  A&Ox4

## 2020-09-03 ENCOUNTER — HOSPITAL ENCOUNTER (EMERGENCY)
Age: 45
Discharge: HOME | End: 2020-09-03
Attending: EMERGENCY MEDICINE
Payer: COMMERCIAL

## 2020-09-03 VITALS
TEMPERATURE: 97.6 F | HEART RATE: 67 BPM | WEIGHT: 140 LBS | HEIGHT: 68 IN | DIASTOLIC BLOOD PRESSURE: 95 MMHG | SYSTOLIC BLOOD PRESSURE: 110 MMHG | OXYGEN SATURATION: 99 % | BODY MASS INDEX: 21.22 KG/M2 | RESPIRATION RATE: 14 BRPM

## 2020-09-03 PROCEDURE — 99283 EMERGENCY DEPT VISIT LOW MDM: CPT

## 2020-09-03 PROCEDURE — 99285 EMERGENCY DEPT VISIT HI MDM: CPT

## 2020-09-03 PROCEDURE — 6370000000 HC RX 637 (ALT 250 FOR IP): Performed by: EMERGENCY MEDICINE

## 2020-09-03 RX ORDER — FLUCONAZOLE 100 MG/1
150 TABLET ORAL ONCE
Qty: 2 TABLET | Refills: 0 | Status: SHIPPED | OUTPATIENT
Start: 2020-09-03 | End: 2020-09-03

## 2020-09-03 RX ORDER — ACETAMINOPHEN 500 MG
1000 TABLET ORAL ONCE
Status: COMPLETED | OUTPATIENT
Start: 2020-09-03 | End: 2020-09-03

## 2020-09-03 RX ORDER — ACETAMINOPHEN 325 MG/1
650 TABLET ORAL EVERY 6 HOURS PRN
Qty: 120 TABLET | Refills: 3 | Status: SHIPPED | OUTPATIENT
Start: 2020-09-03

## 2020-09-03 RX ORDER — NAPROXEN 250 MG/1
500 TABLET ORAL ONCE
Status: COMPLETED | OUTPATIENT
Start: 2020-09-03 | End: 2020-09-03

## 2020-09-03 RX ORDER — GABAPENTIN 100 MG/1
800 CAPSULE ORAL 3 TIMES DAILY
Qty: 720 CAPSULE | Refills: 0 | Status: SHIPPED | OUTPATIENT
Start: 2020-09-03 | End: 2021-03-02

## 2020-09-03 RX ORDER — NAPROXEN 500 MG/1
500 TABLET ORAL 2 TIMES DAILY
Qty: 60 TABLET | Refills: 0 | Status: SHIPPED | OUTPATIENT
Start: 2020-09-03

## 2020-09-03 RX ORDER — GABAPENTIN 400 MG/1
800 CAPSULE ORAL ONCE
Status: COMPLETED | OUTPATIENT
Start: 2020-09-03 | End: 2020-09-03

## 2020-09-03 RX ADMIN — ACETAMINOPHEN 1000 MG: 500 TABLET ORAL at 03:49

## 2020-09-03 RX ADMIN — Medication 500 MG: at 03:52

## 2020-09-03 RX ADMIN — GABAPENTIN 800 MG: 400 CAPSULE ORAL at 03:52

## 2020-09-03 ASSESSMENT — PAIN DESCRIPTION - PAIN TYPE: TYPE: ACUTE PAIN

## 2020-09-03 ASSESSMENT — ENCOUNTER SYMPTOMS
EYES NEGATIVE: 1
GASTROINTESTINAL NEGATIVE: 1
RESPIRATORY NEGATIVE: 1
ALLERGIC/IMMUNOLOGIC NEGATIVE: 1

## 2020-09-03 ASSESSMENT — PAIN DESCRIPTION - LOCATION: LOCATION: LEG

## 2020-09-03 ASSESSMENT — PAIN DESCRIPTION - ORIENTATION: ORIENTATION: LEFT

## 2020-09-03 ASSESSMENT — PAIN SCALES - GENERAL
PAINLEVEL_OUTOF10: 10
PAINLEVEL_OUTOF10: 9
PAINLEVEL_OUTOF10: 10

## 2020-09-03 NOTE — CARE COORDINATION
CM -Off the board --invested a significant amount of time for pt -had some difficulty identifying her problems as she attempted to express them -her thought were scattered , she was quick to anger , impatient , fidgety and frequently answered her own questions as statements . Pt was basically homeless but has a place to stay then didn't -very inconsistent with answers--and as conversation progressed , pt was very familiar with area resources. Advised her as to the current shelter situation -with vouchers (as she knew about) Pt asked to take a shower here so she could delouse (had unfilled prescriptions in her purse)  Told her -negative , no public showers at the hospital . Pt continued to ramble at times -wanted a note saying she was here and eventually walked off without it -failing to return .  Cisco martinez / Evelio Jorgensen / Myron Obrien

## 2020-09-03 NOTE — ED NOTES
Patient talked with Case Management and then left ED front doors. Sitting on bench out front of ED.       Osvaldo Gee RN  09/02/20 7175

## 2020-09-03 NOTE — ED NOTES
Bed: ED-23  Expected date: 9/3/20  Expected time: 8:48 AM  Means of arrival: [facility]  Comments:  SPD, Bizarre behavior     [facility], [address] [facility]  09/03/20 8257

## 2020-09-03 NOTE — ED NOTES
Pt given 2 boxed lunches as requested  Dr Krystyna Castellon at bedside.      [facility], RN  09/03/20 1854

## 2020-09-03 NOTE — ED NOTES
Patient back in waiting room per patient request. Eating in lobby. Walking without difficulty. Cesar with Case Management speaking with patient now.      Osvaldo Gee RN  09/02/20 2058

## 2020-09-03 NOTE — ED NOTES
Belligerent with medical staff as well as security. Difficult to redirect and to keep on cot/yan bed. Continues to wonder the department, becoming argumentative with staff during all encounters and attempts of redirections.       Lisbeth Hanks RN  09/03/20 4069

## 2020-09-03 NOTE — ED NOTES
Discharge instructions given, pt voiced understanding. Pt ambulated to waiting room with gait steady. No distress noted. [facility].  Pt denies further needs at this time     Stew Lockhart RN  09/03/20 6294

## 2020-09-03 NOTE — ED TRIAGE NOTES
Pt brought to ED today by EMS and SPD for c/o falling asleep frequently and not being able to stay awake. Pt reports she has a dr appt later today. Pt states she is just very tired and hungry. SPD reports pt was at their building last night and was \"kicked out\". SPD today reports they were initially called for her trespassing at a dentist office and they felt she is unable to care for herself so they brought her to the ED. No reports of SI or HI mentioned.  Pt will not be PINK slipped by SPD at this time

## 2020-09-03 NOTE — ED PROVIDER NOTES
[address]      TRIAGE CHIEF COMPLAINT:   Leg Pain (pt states nerve pain)      [place]:  Any Brian is a 41 y.o. female that presents with a complaint of chronic bilateral leg pain, nerve pain. Patient is homeless I saw her here recently for similar complaint she has not followed up as directed or had her medication filled as directed. She is on Suboxone. She was here earlier today but left prior to physician evaluation she states she is here earlier for  Housing help and to eat. She is requesting to eat again. Denies any fevers nausea vomiting chest pain shortness of breath abdominal pain trauma night sweats weight loss fevers bowel bladder incontinence saddle anesthesia. Last time I treated her for STDs she would like Diflucan for possible yeast infection otherwise she states she has chronic paresthesias not new she is requesting refills of her gabapentin. Denies other questions or concerns she ambulates here without difficulty. REVIEW OF SYSTEMS:  At least 10 systems reviewed and otherwise acutely negative except as in the [address] Review of Systems   Constitutional: Negative. HENT: Negative. Eyes: Negative. Respiratory: Negative. Cardiovascular: Negative. Gastrointestinal: Negative. Endocrine: Negative. Genitourinary: Negative. Musculoskeletal: Negative. Skin: Negative. Allergic/Immunologic: Negative. Neurological: Positive for numbness. Hematological: Negative. Psychiatric/Behavioral: Negative. All other systems reviewed and are negative. Past Medical History:   Diagnosis Date    Depression     Fibromyalgia 2020    Hepatitis C     Stated by patient     Past Surgical History:   Procedure Laterality Date    HYSTERECTOMY       No family history on file.   Social History     Socioeconomic History    Marital status: Unknown     Spouse name: Not on file    Number of children: Not on file    Years of education: Not on file    Highest education level: Not on file   Occupational History    Not on file   Social Needs    Financial resource strain: Not on file    Food insecurity     Worry: Not on file     Inability: Not on file    Transportation needs     Medical: Not on file     Non-medical: Not on file   Tobacco Use    Smoking status: Current Some Day Smoker     Types: Cigarettes     Last attempt to quit: 2020     Years since quittin.4    Smokeless tobacco: Never Used   Substance and Sexual Activity    Alcohol use: Yes     Frequency: Never    Drug use: Yes     Types: Methamphetamines     Comment: 20    Sexual activity: Yes   Lifestyle    Physical activity     Days per week: Not on file     Minutes per session: Not on file    Stress: Not on file   Relationships    Social connections     Talks on phone: Not on file     Gets together: Not on file     Attends Faith service: Not on file     Active member of club or organization: Not on file     Attends meetings of clubs or organizations: Not on file     Relationship status: Not on file    Intimate partner violence     Fear of current or ex partner: Not on file     Emotionally abused: Not on file     Physically abused: Not on file     Forced sexual activity: Not on file   Other Topics Concern    Not on file   Social History Narrative    Not on file     Current Facility-Administered Medications   Medication Dose Route Frequency Provider Last Rate Last Dose    naproxen (NAPROSYN) tablet 500 mg  500 mg Oral Once [facility], DO        acetaminophen (TYLENOL) tablet 1,000 mg  1,000 mg Oral Once [facility], DO        gabapentin (NEURONTIN) capsule 800 mg  800 mg Oral Once [facility], DO         Current Outpatient Medications   Medication Sig Dispense Refill    fluconazole (DIFLUCAN) 100 MG tablet Take 1.5 tablets by mouth once for 1 dose 2 tablet 0    gabapentin (NEURONTIN) 100 MG capsule Take 8 capsules by mouth 3 times daily for 180 days.  Intended supply: 90 days 720 capsule 0    naproxen (NAPROSYN) 500 MG tablet Take 1 tablet by mouth 2 times daily 60 tablet 0    acetaminophen (TYLENOL) 325 MG tablet Take 2 tablets by mouth every 6 hours as needed for Pain 120 tablet 3    clindamycin (CLINDAGEL) 1 % gel Apply topically 2 times daily. 1 Tube 0    naproxen (NAPROSYN) 500 MG tablet Take 1 tablet by mouth 2 times daily 60 tablet 0    acetaminophen (TYLENOL) 325 MG tablet Take 2 tablets by mouth every 6 hours as needed for Pain 120 tablet 3    phenylephrine-cocoa butter (PREPARATION H) 0.25-88.44 % Place 1 suppository rectally as needed for Hemorrhoids or Itching 30 suppository 0    diphenhydrAMINE (BENADRYL) 25 MG capsule Take 1 capsule by mouth every 6 hours as needed for Itching or Allergies 30 capsule 0    permethrin (ELIMITE) 5 % cream Apply topically as directed 1 Tube 0    mupirocin (BACTROBAN) 2 % cream Apply topically 3 times daily. 1 Tube 0    ibuprofen (ADVIL;MOTRIN) 800 MG tablet Take 1 tablet by mouth every 6 hours as needed for Pain 30 tablet 0    hydrOXYzine (VISTARIL) 25 MG capsule Take 1 capsule by mouth 3 times daily as needed for Anxiety 10 capsule 0    permethrin (ELIMITE) 5 % cream Apply topically as directed 1 Tube 0    gabapentin (NEURONTIN) 300 MG capsule Take 1 capsule by mouth 2 times daily for 14 days. 28 capsule 0    methocarbamol (ROBAXIN) 500 MG tablet Take 1 tablet by mouth 4 times daily As needed for muscle spasm.  20 tablet 0    naproxen (NAPROSYN) 500 MG tablet Take 1 tablet by mouth 2 times daily as needed for Pain 30 tablet 0    fluticasone (FLONASE) 50 MCG/ACT nasal spray 1 spray by Nasal route 2 times daily 1 Bottle 0    DULoxetine (CYMBALTA) 30 MG extended release capsule Take 30 mg by mouth 2 times daily      DULoxetine (CYMBALTA) 60 MG extended release capsule Take 60 mg by mouth 2 times daily      cloNIDine (CATAPRES) 0.1 MG tablet Take 0.1 mg by mouth        Allergies   Allergen Reactions    Ceclor [Cefaclor] Hives    Haldol [Haloperidol Lactate] Swelling     Stated that tongue swelled with haldol     Current Facility-Administered Medications   Medication Dose Route Frequency Provider Last Rate Last Dose    naproxen (NAPROSYN) tablet 500 mg  500 mg Oral Once Sheryl Jonel, DO        acetaminophen (TYLENOL) tablet 1,000 mg  1,000 mg Oral Once [place] Jonel, DO        gabapentin (NEURONTIN) capsule 800 mg  800 mg Oral Once [place] Jonel, DO         Current Outpatient Medications   Medication Sig Dispense Refill    fluconazole (DIFLUCAN) 100 MG tablet Take 1.5 tablets by mouth once for 1 dose 2 tablet 0    gabapentin (NEURONTIN) 100 MG capsule Take 8 capsules by mouth 3 times daily for 180 days. Intended supply: 90 days 720 capsule 0    naproxen (NAPROSYN) 500 MG tablet Take 1 tablet by mouth 2 times daily 60 tablet 0    acetaminophen (TYLENOL) 325 MG tablet Take 2 tablets by mouth every 6 hours as needed for Pain 120 tablet 3    clindamycin (CLINDAGEL) 1 % gel Apply topically 2 times daily. 1 Tube 0    naproxen (NAPROSYN) 500 MG tablet Take 1 tablet by mouth 2 times daily 60 tablet 0    acetaminophen (TYLENOL) 325 MG tablet Take 2 tablets by mouth every 6 hours as needed for Pain 120 tablet 3    phenylephrine-cocoa butter (PREPARATION H) 0.25-88.44 % Place 1 suppository rectally as needed for Hemorrhoids or Itching 30 suppository 0    diphenhydrAMINE (BENADRYL) 25 MG capsule Take 1 capsule by mouth every 6 hours as needed for Itching or Allergies 30 capsule 0    permethrin (ELIMITE) 5 % cream Apply topically as directed 1 Tube 0    mupirocin (BACTROBAN) 2 % cream Apply topically 3 times daily.  1 Tube 0    ibuprofen (ADVIL;MOTRIN) 800 MG tablet Take 1 tablet by mouth every 6 hours as needed for Pain 30 tablet 0    hydrOXYzine (VISTARIL) 25 MG capsule Take 1 capsule by mouth 3 times daily as needed for Anxiety 10 capsule 0    permethrin (ELIMITE) 5 % cream Apply topically as directed 1 Tube 0    gabapentin (NEURONTIN) 300 MG capsule Take 1 capsule by mouth 2 times daily for 14 days. 28 capsule 0    methocarbamol (ROBAXIN) 500 MG tablet Take 1 tablet by mouth 4 times daily As needed for muscle spasm. 20 tablet 0    naproxen (NAPROSYN) 500 MG tablet Take 1 tablet by mouth 2 times daily as needed for Pain 30 tablet 0    fluticasone (FLONASE) 50 MCG/ACT nasal spray 1 spray by Nasal route 2 times daily 1 Bottle 0    DULoxetine (CYMBALTA) 30 MG extended release capsule Take 30 mg by mouth 2 times daily      DULoxetine (CYMBALTA) 60 MG extended release capsule Take 60 mg by mouth 2 times daily      cloNIDine (CATAPRES) 0.1 MG tablet Take 0.1 mg by mouth         Nursing Notes Reviewed    VITAL SIGNS:  ED Triage Vitals   Enc Vitals Group      BP       Pulse       Resp       Temp       Temp src       SpO2       Weight       Height       Head Circumference       Peak Flow       Pain Score       Pain Loc       Pain Edu? Excl. in [address]? PHYSICAL EXAM:  Physical Exam  Vitals signs and nursing note reviewed. Constitutional:       General: She is not in acute distress. Appearance: Normal appearance. She is well-developed and well-groomed. She is not ill-appearing, toxic-appearing or diaphoretic. HENT:      Head: Normocephalic and atraumatic. Right Ear: External ear normal.      Left Ear: External ear normal.      Nose: No congestion or rhinorrhea. Mouth/Throat:      Mouth: Mucous membranes are moist.      Pharynx: No oropharyngeal exudate or posterior oropharyngeal erythema. Eyes:      General: No scleral icterus. Right eye: No discharge. Left eye: No discharge. Extraocular Movements: Extraocular movements intact. Conjunctiva/sclera: Conjunctivae normal.      Pupils: Pupils are equal, round, and reactive to light. Neck:      Musculoskeletal: Full passive range of motion without pain and normal range of motion. Normal range of motion.  No edema, erythema, neck rigidity, crepitus, injury or pain with movement. Vascular: No JVD. Trachea: Phonation normal.   Cardiovascular:      Rate and Rhythm: Normal rate and regular rhythm. Pulses: Normal pulses. Heart sounds: Normal heart sounds. No murmur. No gallop. Pulmonary:      Effort: Pulmonary effort is normal. No respiratory distress. Breath sounds: Normal breath sounds. No stridor. No wheezing, rhonchi or rales. Abdominal:      General: Bowel sounds are normal. There is no distension. There are no signs of injury. Palpations: Abdomen is soft. There is no mass or pulsatile mass. Tenderness: There is no abdominal tenderness. There is no guarding or rebound. Negative signs include Castro's sign, Rovsing's sign and McBurney's sign. Hernia: No hernia is present. Musculoskeletal: Normal range of motion. General: No swelling, tenderness, deformity or signs of injury. Right lower leg: No edema. Left lower leg: No edema. Skin:     General: Skin is warm. Coloration: Skin is not jaundiced or pale. Findings: No bruising, erythema, lesion or rash. Neurological:      General: No focal deficit present. Mental Status: She is alert and oriented to person, place, and time. GCS: GCS eye subscore is 4. GCS verbal subscore is 5. GCS motor subscore is 6. Cranial Nerves: Cranial nerves are intact. No cranial nerve deficit, dysarthria or facial asymmetry. Sensory: Sensory deficit present. Motor: Motor function is intact. No weakness, tremor, atrophy, abnormal muscle tone or seizure activity. Coordination: Coordination is intact. Coordination normal. Finger-Nose-Finger Test normal.      Gait: Gait is intact.  Gait normal.      Comments: Decreased sensation of both legs chronic no bowel bladder incontinence no saddle anesthesia normal gait   Psychiatric:         Mood and Affect: Mood normal.         Behavior: Behavior normal. Behavior is cooperative. Thought Content: Thought content normal.         Judgment: Judgment normal.           I have reviewed andinterpreted all of the currently available lab results from this visit (if applicable):    No results found for this visit on 09/03/20. Radiographs (if obtained):  [] The following radiograph was interpreted by myself in the absence of a radiologist:  [x] Radiologist's Report Reviewed:    Xr Femur Right (min 2 Views)    Result Date: 8/13/2020  EXAMINATION: 2 XRAY VIEWS OF THE RIGHT FEMUR 8/12/2020 11:45 pm COMPARISON: None. HISTORY: ORDERING SYSTEM PROVIDED HISTORY: pain TECHNOLOGIST PROVIDED HISTORY: Reason for exam:->pain Reason for Exam: pain for months, NKI Acuity: Acute Type of Exam: Initial Right femur pain FINDINGS: There is no evidence of acute fracture. There is normal alignment. No acute joint abnormality. No focal osseous lesion. No focal soft tissue abnormality. No acute osseous abnormality. EKG (if obtained): (All EKG's are interpreted by myself in the absence of a cardiologist)    MDM:    Patient here with chronic paresthesias, homeless. Patient was seen by me recently for similar complaint she has not followed up as directed or gotten her prescriptions filled as directed. Patient was here earlier today but she left prior to physician evaluation. Patient states she was here because she needed housing because she is homeless she states she got it but now complains of continued chronic paresthesias nothing new denies any fevers nausea vomiting chest pain shortness of breath night sweats weight loss bowel bladder incontinence saddle anesthesia. She has a normal gait she is on Suboxone patient otherwise appears well. She would like Diflucan for possible yeast infection after I treated for STDs last week. I do not think she needs labs or imaging today.   I will give her her gabapentin and anti-inflammatories discharge her home I will let her eat as she is requesting to eat and drink here. Patient stable discharge at night she has cauda equina or epidural abscess or hematoma patient discharged stable given return precautions and follow-up information. She appears otherwise well. CLINICAL IMPRESSION:  Final diagnoses:   Chronic pain of lower extremity, unspecified laterality   Homeless       (Please note that portions of this note may have been completed with a voice recognition program. Efforts were made to edit the dictations but occasionally words aremis-transcribed.)    DISPOSITION REFERRAL (if applicable):  [facility] Emergency Department  [facility] [phone] [id number] [phone]    If symptoms worsen    [facility] - ADULT  [address]  [phone]  Schedule an appointment as soon as possible for a visit in 1 day        DISPOSITION MEDICATIONS (if applicable):  New Prescriptions    ACETAMINOPHEN (TYLENOL) 325 MG TABLET    Take 2 tablets by mouth every 6 hours as needed for Pain    FLUCONAZOLE (DIFLUCAN) 100 MG TABLET    Take 1.5 tablets by mouth once for 1 dose    GABAPENTIN (NEURONTIN) 100 MG CAPSULE    Take 8 capsules by mouth 3 times daily for 180 days.  Intended supply: 90 days    NAPROXEN (NAPROSYN) 500 MG TABLET    Take 1 tablet by mouth 2 times daily          DO Berenice Zabala DO  09/03/20 3965

## 2020-09-03 NOTE — ED PROVIDER NOTES
Triage Chief Complaint:   Other (pt brought to ED today by EMS and Police because she was not alert, falls asleep frequently, police felt she is a danger to herself)    [place]:  Jeff Lr is a 41 y.o. female that presents with increased fatigue. Patient reports to me that she is tired and just has not slept in a long time. Patient does endorse taking several different drugs recently including methamphetamine. Patient denies any thoughts of plan to herself or anyone else. Patient reports \"I just need somewhere to sleep\". Patient tells me that she been trying to get her affairs in order and actually has plans to go get documentation notarized today. Police found the patient very sleepy and brought her to the emergency department because they were concerned that she was a danger to herself. Patient ensures me that she is able to care for herself and does not want to be here. Patient has somatic complaints of chronic leg pain but otherwise denies any other new issues. ROS:  General:  No fevers, no chills  ENT: No sore throat, no runny nose  Cardiovascular:  No chest pain, no palpitations  Respiratory:  No shortness of breath, no cough  Gastrointestinal:  No pain, no nausea, no vomiting, no diarrhea  : No pain with urinating, no increased frequency urinating  Neurologic:  No numbness, no weakness  Extremities:  No edema, + chronic leg pain  Skin:  No rash  Psych: No axienty    Past Medical History:   Diagnosis Date    Depression     Fibromyalgia 2020    Hepatitis C     Stated by patient     Past Surgical History:   Procedure Laterality Date    HYSTERECTOMY       History reviewed. No pertinent family history.   Social History     Socioeconomic History    Marital status: Unknown     Spouse name: Not on file    Number of children: Not on file    Years of education: Not on file    Highest education level: Not on file   Occupational History    Not on file   Social Needs    Financial resource strain: (TYLENOL) 325 MG tablet Take 2 tablets by mouth every 6 hours as needed for Pain 120 tablet 3    phenylephrine-cocoa butter (PREPARATION H) 0.25-88.44 % Place 1 suppository rectally as needed for Hemorrhoids or Itching 30 suppository 0    diphenhydrAMINE (BENADRYL) 25 MG capsule Take 1 capsule by mouth every 6 hours as needed for Itching or Allergies 30 capsule 0    permethrin (ELIMITE) 5 % cream Apply topically as directed 1 Tube 0    mupirocin (BACTROBAN) 2 % cream Apply topically 3 times daily. 1 Tube 0    ibuprofen (ADVIL;MOTRIN) 800 MG tablet Take 1 tablet by mouth every 6 hours as needed for Pain 30 tablet 0    hydrOXYzine (VISTARIL) 25 MG capsule Take 1 capsule by mouth 3 times daily as needed for Anxiety 10 capsule 0    permethrin (ELIMITE) 5 % cream Apply topically as directed 1 Tube 0    gabapentin (NEURONTIN) 300 MG capsule Take 1 capsule by mouth 2 times daily for 14 days. 28 capsule 0    methocarbamol (ROBAXIN) 500 MG tablet Take 1 tablet by mouth 4 times daily As needed for muscle spasm.  20 tablet 0    naproxen (NAPROSYN) 500 MG tablet Take 1 tablet by mouth 2 times daily as needed for Pain 30 tablet 0    fluticasone (FLONASE) 50 MCG/ACT nasal spray 1 spray by Nasal route 2 times daily 1 Bottle 0    DULoxetine (CYMBALTA) 30 MG extended release capsule Take 30 mg by mouth 2 times daily      DULoxetine (CYMBALTA) 60 MG extended release capsule Take 60 mg by mouth 2 times daily      cloNIDine (CATAPRES) 0.1 MG tablet Take 0.1 mg by mouth       Allergies   Allergen Reactions    Ceclor [Cefaclor] Hives    Haldol [Haloperidol Lactate] Swelling     Stated that tongue swelled with haldol       Nursing Notes Reviewed    Physical Exam:  ED Triage Vitals [09/03/20 0912]   Enc Vitals Group      BP (!) 110/95      Pulse 67      Resp 14      Temp 97.6 °F (36.4 °C)      Temp Source Oral      SpO2 99 %      Weight 140 lb (63.5 kg)      Height 5' 8\" (1.727 m)      Head Circumference       Peak Flow Pain Score       Pain Loc       Pain Edu? Excl. in [address]? My pulse ox interpretation is - normal    General appearance:  No acute distress. Sitting comfortably in bed. Patient is eating to lunch boxes. Skin:  Warm. Dry. No abrasions or lacerations noted. Some scattered skin sores. Eye:  Extraocular movements intact. Pupils equal round react to light. Ears, nose, mouth and throat:  Oral mucosa moist.  No cephalhematoma. Neck: No midline bony cervical tenderness. Normal range of motion without pain. No meningismus. Extremity:  No swelling. Normal ROM     Heart:  Regular rate and rhythm, normal S1 & S2, no extra heart sounds. Abdomen:  Soft. Nontender. Nondistended. No rebound or guarding. Respiratory:  Lungs clear to auscultation bilaterally. Respirations nonlabored. Neurological:  Alert and oriented times 3. Somewhat drowsy but easily awakens to voice. Ambulatory with a steady gait. Sensation intact to light touch to distal upper/lower extremities; 5/5 and symmetric shrug, , HF, KF/KE, and dorsi/plantar flexion; symmetric brow raise and nasolabial folds, tongue midline; FTN and VIRGIE intact; 2+ and symmetric reflexes to bilateral upper/lower extremities; ambulates with steady gait; no dysarthria or aphasia                I have reviewed and interpreted all of the currently available lab results from this visit (if applicable):  No results found for this visit on 09/03/20. Radiographs (if obtained):  [] The following radiograph was interpreted by myself in the absence of a radiologist:   [] Radiologist's Report Reviewed:  No orders to display         EKG (if obtained): (All EKG's are interpreted by myself in the absence of a cardiologist)    Chart review shows recent radiographs:  Xr Femur Right (min 2 Views)    Result Date: 8/13/2020  EXAMINATION: 2 XRAY VIEWS OF THE RIGHT FEMUR 8/12/2020 11:45 pm COMPARISON: None.  HISTORY: ORDERING SYSTEM PROVIDED HISTORY: pain

## 2020-09-03 NOTE — CARE COORDINATION
LSW was consulted to assist this pt with discharge planning. Pt here today via EMS and Police because she was not alert, falls asleep frequently, police felt she is a danger to herself. LSW is familiar w/pt as just worked w/her 8/27. LSW spoke to this pt and pt presents very similar to 8/27. Pt at times seems to nod off, some difficulty staying awake. Pt states she just wants to sleep for a few hours. LSW notes she cannot do this in ED. Pt has no other complaints than wanting a place to stay. Pt occasionally eating frito's dipped in peanut butter. LSW asks pt if she wants to go to [address] [place] [address]. They have walk up 1st come 1st serve today 10:30 to noon. They may be able to provide pt w/room for a night, help her get ID and have food. Pt is amenable and started talking about other options. LSW notes if she wants assistance she needs to get there asap. Pt acknowledges this. LSW spoke to Dr. Tanvi De Jesus about POC and possible d/c w/transport of pt to [address] [place] [address]. He is in agreement. Both agree pt is @ baseline. 56  LSW apprised Shmuel of POC and called [facility]. [facility] noted ETA 1055.    1100  [facility] here to transport pt. Pt ambulates on own to his vehicle.

## 2020-09-03 NOTE — ED TRIAGE NOTES
Patient to ED with complaints of left leg pain and reports nerve pain. Patient irritable, rude with staff. Patient alert, but falling asleep in wheelchair she provided for herself on arrival to ED. Patient seen earlier by Case Management who reports patient looking for a place to stay due to being homeless. Cesar RN with case management reported patient had lice.

## 2020-10-27 ENCOUNTER — HOSPITAL ENCOUNTER (EMERGENCY)
Age: 45
Discharge: HOME | End: 2020-10-27
Payer: COMMERCIAL

## 2020-10-27 VITALS
OXYGEN SATURATION: 98 % | HEART RATE: 98 BPM | RESPIRATION RATE: 16 BRPM | SYSTOLIC BLOOD PRESSURE: 124 MMHG | HEIGHT: 68 IN | BODY MASS INDEX: 21.22 KG/M2 | TEMPERATURE: 97.5 F | WEIGHT: 140 LBS | DIASTOLIC BLOOD PRESSURE: 84 MMHG

## 2020-10-27 PROCEDURE — 4500000028 HC INTERMEDIATE PROCEDURE

## 2020-10-27 PROCEDURE — 99282 EMERGENCY DEPT VISIT SF MDM: CPT

## 2020-10-27 RX ORDER — CLINDAMYCIN HYDROCHLORIDE 150 MG/1
450 CAPSULE ORAL 3 TIMES DAILY
Qty: 90 CAPSULE | Refills: 0 | Status: SHIPPED | OUTPATIENT
Start: 2020-10-27 | End: 2020-11-06

## 2020-10-27 RX ORDER — IBUPROFEN 600 MG/1
600 TABLET ORAL EVERY 6 HOURS PRN
Qty: 30 TABLET | Refills: 0 | Status: SHIPPED | OUTPATIENT
Start: 2020-10-27

## 2020-10-27 ASSESSMENT — PAIN SCALES - GENERAL: PAINLEVEL_OUTOF10: 10

## 2020-10-27 NOTE — ED PROVIDER NOTES
eMERGENCY dEPARTMENT eNCOUnter      PCP: No primary care provider on file. CHIEF COMPLAINT    Chief Complaint   Patient presents with   Tr Duncan is a 41 y.o. female who presents with right ear pain and concern for a blister in her right ear. Patient states that she has noticed this over the past several days, now feels like a pressure. She states initially scratch that had small amount of bleeding. She endorses subjective fevers at home. No decrease in hearing. No current drainage from the ear. Is not diabetic. REVIEW OF SYSTEMS    General: + subjective fevers, no syncope  ENT:  See HPI. No sorethroat, sinus pressure. Pulmonary: No cough  GI: No abdominal pain, vomiting or diarrhea  Neurologic: No dizziness, lightheadedness, numbness/tingling, confusion. Skin: No rash    All other review of systems are negative  See HPI and nursing notes for additional information     PAST MEDICAL AND SURGICAL HISTORY    Past Medical History:   Diagnosis Date    Depression     Fibromyalgia 2020    Hepatitis C     Stated by patient     Past Surgical History:   Procedure Laterality Date    HYSTERECTOMY       CURRENT MEDICATIONS    Current Outpatient Rx   Medication Sig Dispense Refill    clindamycin (CLEOCIN) 150 MG capsule Take 3 capsules by mouth 3 times daily for 10 days 90 capsule 0    ibuprofen (ADVIL;MOTRIN) 600 MG tablet Take 1 tablet by mouth every 6 hours as needed for Pain 30 tablet 0    gabapentin (NEURONTIN) 100 MG capsule Take 8 capsules by mouth 3 times daily for 180 days.  Intended supply: 90 days 720 capsule 0    naproxen (NAPROSYN) 500 MG tablet Take 1 tablet by mouth 2 times daily 60 tablet 0    acetaminophen (TYLENOL) 325 MG tablet Take 2 tablets by mouth every 6 hours as needed for Pain 120 tablet 3    naproxen (NAPROSYN) 500 MG tablet Take 1 tablet by mouth 2 times daily 60 tablet 0    acetaminophen (TYLENOL) 325 MG tablet Take 2 tablets by mouth every 6 hours as needed for Pain 120 tablet 3    phenylephrine-cocoa butter (PREPARATION H) 0.25-88.44 % Place 1 suppository rectally as needed for Hemorrhoids or Itching 30 suppository 0    permethrin (ELIMITE) 5 % cream Apply topically as directed 1 Tube 0    hydrOXYzine (VISTARIL) 25 MG capsule Take 1 capsule by mouth 3 times daily as needed for Anxiety 10 capsule 0    permethrin (ELIMITE) 5 % cream Apply topically as directed 1 Tube 0    gabapentin (NEURONTIN) 300 MG capsule Take 1 capsule by mouth 2 times daily for 14 days. 28 capsule 0    methocarbamol (ROBAXIN) 500 MG tablet Take 1 tablet by mouth 4 times daily As needed for muscle spasm. 20 tablet 0    naproxen (NAPROSYN) 500 MG tablet Take 1 tablet by mouth 2 times daily as needed for Pain 30 tablet 0    fluticasone (FLONASE) 50 MCG/ACT nasal spray 1 spray by Nasal route 2 times daily 1 Bottle 0    DULoxetine (CYMBALTA) 30 MG extended release capsule Take 30 mg by mouth 2 times daily      DULoxetine (CYMBALTA) 60 MG extended release capsule Take 60 mg by mouth 2 times daily      cloNIDine (CATAPRES) 0.1 MG tablet Take 0.1 mg by mouth       ALLERGIES    Allergies   Allergen Reactions    Ceclor [Cefaclor] Hives    Haldol [Haloperidol Lactate] Swelling     Stated that tongue swelled with haldol     FAMILY AND SOCIAL HISTORY    History reviewed. No pertinent family history.   Social History     Socioeconomic History    Marital status: Unknown     Spouse name: None    Number of children: None    Years of education: None    Highest education level: None   Occupational History    None   Social Needs    Financial resource strain: None    Food insecurity     Worry: None     Inability: None    Transportation needs     Medical: None     Non-medical: None   Tobacco Use    Smoking status: Current Some Day Smoker     Types: Cigarettes     Last attempt to quit: 2020     Years since quittin.5    Smokeless tobacco: Never Used   Substance and Sexual Activity returning with any new or worsening symptoms, otherwise is provided with referral to ENT provider. She has no mastoid erythema or tenderness or other clinical findings or history concerning for the emergent process. She is given follow-up with our walk-in clinic as well as ENT provider. Will discharge with clindamycin due to allergies to cephalosporins. The patient and/or the family were informed of the results of any tests/labs/imaging, the treatment plan, and time was allotted to answer questions. Vitals:    10/27/20 0734 10/27/20 0821 10/27/20 0822 10/27/20 0825   BP: (!) 108/95 124/84     Pulse: 104 98     Resp: 16 16     Temp:    97.5 °F (36.4 °C)   TempSrc:    Oral   SpO2: 99% 98%     Weight:   140 lb (63.5 kg)    Height:   5' 8\" (1.727 m)            Clinical  IMPRESSION    1. Abscess of right ear canal        Diagnosis and plan discussed in detail with patient who understands and agrees. Patient agrees to return emergency department if symptoms worsen or any new symptoms develop. Comment: Please note this report has been produced using speech recognition software and may contain errors related to that system including errors in grammar, punctuation, and spelling, as well as words and phrases that may be inappropriate. If there are any questions or concerns please feel free to contact the dictating provider for clarification.             LELO Iqbal  10/27/20 9885

## 2020-10-27 NOTE — ED NOTES
.Discharge instructions / prescriptions given and reviewed. Signature obtained. Patient instructed to return if symptoms get worse or any new problems or discomforts arise. No further questions at this time.      Bety Barajas RN  10/27/20 0674

## 2021-03-09 ENCOUNTER — APPOINTMENT (OUTPATIENT)
Dept: CT IMAGING | Age: 46
End: 2021-03-09
Payer: COMMERCIAL

## 2021-03-09 ENCOUNTER — APPOINTMENT (OUTPATIENT)
Dept: GENERAL RADIOLOGY | Age: 46
End: 2021-03-09
Payer: COMMERCIAL

## 2021-03-09 ENCOUNTER — HOSPITAL ENCOUNTER (EMERGENCY)
Age: 46
Discharge: PSYCHIATRIC OR SUBSTANCE USE FACILITY | End: 2021-03-10
Attending: EMERGENCY MEDICINE
Payer: COMMERCIAL

## 2021-03-09 DIAGNOSIS — F19.10 POLYSUBSTANCE ABUSE (HCC): ICD-10-CM

## 2021-03-09 DIAGNOSIS — N39.0 URINARY TRACT INFECTION WITHOUT HEMATURIA, SITE UNSPECIFIED: Primary | ICD-10-CM

## 2021-03-09 DIAGNOSIS — F29 PSYCHOSIS, UNSPECIFIED PSYCHOSIS TYPE (HCC): ICD-10-CM

## 2021-03-09 LAB
ACETAMINOPHEN LEVEL: <5 UG/ML (ref 15–30)
ALBUMIN SERPL-MCNC: 3.6 GM/DL (ref 3.4–5)
ALCOHOL SCREEN SERUM: <0.01 %WT/VOL
ALP BLD-CCNC: 72 IU/L (ref 40–129)
ALT SERPL-CCNC: 44 U/L (ref 10–40)
AMPHETAMINES: ABNORMAL
ANION GAP SERPL CALCULATED.3IONS-SCNC: 9 MMOL/L (ref 4–16)
AST SERPL-CCNC: 33 IU/L (ref 15–37)
BACTERIA: NEGATIVE /HPF
BARBITURATE SCREEN URINE: NEGATIVE
BASOPHILS ABSOLUTE: 0.1 K/CU MM
BASOPHILS RELATIVE PERCENT: 1 % (ref 0–1)
BENZODIAZEPINE SCREEN, URINE: NEGATIVE
BILIRUB SERPL-MCNC: 0.3 MG/DL (ref 0–1)
BILIRUBIN URINE: NEGATIVE MG/DL
BLOOD, URINE: ABNORMAL
BUN BLDV-MCNC: 12 MG/DL (ref 6–23)
CALCIUM SERPL-MCNC: 8.9 MG/DL (ref 8.3–10.6)
CANNABINOID SCREEN URINE: ABNORMAL
CHLORIDE BLD-SCNC: 106 MMOL/L (ref 99–110)
CLARITY: ABNORMAL
CO2: 23 MMOL/L (ref 21–32)
COCAINE METABOLITE: NEGATIVE
COLOR: YELLOW
CREAT SERPL-MCNC: 0.6 MG/DL (ref 0.6–1.1)
DIFFERENTIAL TYPE: ABNORMAL
DOSE AMOUNT: ABNORMAL
DOSE AMOUNT: ABNORMAL
DOSE TIME: ABNORMAL
DOSE TIME: ABNORMAL
EOSINOPHILS ABSOLUTE: 0.5 K/CU MM
EOSINOPHILS RELATIVE PERCENT: 7 % (ref 0–3)
GFR AFRICAN AMERICAN: >60 ML/MIN/1.73M2
GFR NON-AFRICAN AMERICAN: >60 ML/MIN/1.73M2
GLUCOSE BLD-MCNC: 84 MG/DL (ref 70–99)
GLUCOSE, URINE: NEGATIVE MG/DL
HCT VFR BLD CALC: 45 % (ref 37–47)
HEMOGLOBIN: 14.5 GM/DL (ref 12.5–16)
IMMATURE NEUTROPHIL %: 0.3 % (ref 0–0.43)
KETONES, URINE: NEGATIVE MG/DL
LEUKOCYTE ESTERASE, URINE: ABNORMAL
LYMPHOCYTES ABSOLUTE: 2.7 K/CU MM
LYMPHOCYTES RELATIVE PERCENT: 37.9 % (ref 24–44)
MCH RBC QN AUTO: 30.9 PG (ref 27–31)
MCHC RBC AUTO-ENTMCNC: 32.2 % (ref 32–36)
MCV RBC AUTO: 95.9 FL (ref 78–100)
MONOCYTES ABSOLUTE: 0.5 K/CU MM
MONOCYTES RELATIVE PERCENT: 7.7 % (ref 0–4)
MUCUS: ABNORMAL HPF
NITRITE URINE, QUANTITATIVE: NEGATIVE
NUCLEATED RBC %: 0 %
OPIATES, URINE: NEGATIVE
OXYCODONE: NEGATIVE
PDW BLD-RTO: 12.8 % (ref 11.7–14.9)
PH, URINE: 7 (ref 5–8)
PHENCYCLIDINE, URINE: NEGATIVE
PLATELET # BLD: 179 K/CU MM (ref 140–440)
PMV BLD AUTO: 11.6 FL (ref 7.5–11.1)
POTASSIUM SERPL-SCNC: 3.8 MMOL/L (ref 3.5–5.1)
PROTEIN UA: NEGATIVE MG/DL
RBC # BLD: 4.69 M/CU MM (ref 4.2–5.4)
RBC URINE: 3 /HPF (ref 0–6)
SALICYLATE LEVEL: 0.4 MG/DL (ref 15–30)
SARS-COV-2, NAAT: NOT DETECTED
SEGMENTED NEUTROPHILS ABSOLUTE COUNT: 3.3 K/CU MM
SEGMENTED NEUTROPHILS RELATIVE PERCENT: 46.1 % (ref 36–66)
SODIUM BLD-SCNC: 138 MMOL/L (ref 135–145)
SOURCE: NORMAL
SPECIFIC GRAVITY UA: 1.02 (ref 1–1.03)
SQUAMOUS EPITHELIAL: 3 /HPF
TOTAL IMMATURE NEUTOROPHIL: 0.02 K/CU MM
TOTAL NUCLEATED RBC: 0 K/CU MM
TOTAL PROTEIN: 6.3 GM/DL (ref 6.4–8.2)
TRICHOMONAS: ABNORMAL /HPF
TSH HIGH SENSITIVITY: 0.84 UIU/ML (ref 0.27–4.2)
UROBILINOGEN, URINE: 2 MG/DL (ref 0.2–1)
WBC # BLD: 7 K/CU MM (ref 4–10.5)
WBC UA: 87 /HPF (ref 0–5)

## 2021-03-09 PROCEDURE — 71045 X-RAY EXAM CHEST 1 VIEW: CPT

## 2021-03-09 PROCEDURE — 81001 URINALYSIS AUTO W/SCOPE: CPT

## 2021-03-09 PROCEDURE — 93010 ELECTROCARDIOGRAM REPORT: CPT | Performed by: INTERNAL MEDICINE

## 2021-03-09 PROCEDURE — 80053 COMPREHEN METABOLIC PANEL: CPT

## 2021-03-09 PROCEDURE — 6370000000 HC RX 637 (ALT 250 FOR IP): Performed by: EMERGENCY MEDICINE

## 2021-03-09 PROCEDURE — 87635 SARS-COV-2 COVID-19 AMP PRB: CPT

## 2021-03-09 PROCEDURE — 84443 ASSAY THYROID STIM HORMONE: CPT

## 2021-03-09 PROCEDURE — 85025 COMPLETE CBC W/AUTO DIFF WBC: CPT

## 2021-03-09 PROCEDURE — 6360000002 HC RX W HCPCS: Performed by: PHYSICIAN ASSISTANT

## 2021-03-09 PROCEDURE — 87491 CHLMYD TRACH DNA AMP PROBE: CPT

## 2021-03-09 PROCEDURE — 6370000000 HC RX 637 (ALT 250 FOR IP): Performed by: PHYSICIAN ASSISTANT

## 2021-03-09 PROCEDURE — 87591 N.GONORRHOEAE DNA AMP PROB: CPT

## 2021-03-09 PROCEDURE — G0480 DRUG TEST DEF 1-7 CLASSES: HCPCS

## 2021-03-09 PROCEDURE — 96372 THER/PROPH/DIAG INJ SC/IM: CPT

## 2021-03-09 PROCEDURE — 93005 ELECTROCARDIOGRAM TRACING: CPT | Performed by: PHYSICIAN ASSISTANT

## 2021-03-09 PROCEDURE — 70450 CT HEAD/BRAIN W/O DYE: CPT

## 2021-03-09 PROCEDURE — 87086 URINE CULTURE/COLONY COUNT: CPT

## 2021-03-09 PROCEDURE — 99285 EMERGENCY DEPT VISIT HI MDM: CPT

## 2021-03-09 PROCEDURE — 80307 DRUG TEST PRSMV CHEM ANLYZR: CPT

## 2021-03-09 RX ORDER — DIPHENHYDRAMINE HYDROCHLORIDE 50 MG/ML
50 INJECTION INTRAMUSCULAR; INTRAVENOUS ONCE
Status: COMPLETED | OUTPATIENT
Start: 2021-03-09 | End: 2021-03-09

## 2021-03-09 RX ORDER — DIPHENHYDRAMINE HCL 25 MG
50 TABLET ORAL ONCE
Status: COMPLETED | OUTPATIENT
Start: 2021-03-09 | End: 2021-03-09

## 2021-03-09 RX ORDER — ZIPRASIDONE MESYLATE 20 MG/ML
20 INJECTION, POWDER, LYOPHILIZED, FOR SOLUTION INTRAMUSCULAR ONCE
Status: COMPLETED | OUTPATIENT
Start: 2021-03-09 | End: 2021-03-10

## 2021-03-09 RX ORDER — SULFAMETHOXAZOLE AND TRIMETHOPRIM 800; 160 MG/1; MG/1
1 TABLET ORAL ONCE
Status: COMPLETED | OUTPATIENT
Start: 2021-03-09 | End: 2021-03-09

## 2021-03-09 RX ORDER — NICOTINE 21 MG/24HR
1 PATCH, TRANSDERMAL 24 HOURS TRANSDERMAL ONCE
Status: DISCONTINUED | OUTPATIENT
Start: 2021-03-09 | End: 2021-03-10 | Stop reason: HOSPADM

## 2021-03-09 RX ORDER — LORAZEPAM 1 MG/1
2 TABLET ORAL ONCE
Status: COMPLETED | OUTPATIENT
Start: 2021-03-09 | End: 2021-03-09

## 2021-03-09 RX ORDER — LORAZEPAM 2 MG/ML
2 INJECTION INTRAMUSCULAR ONCE
Status: COMPLETED | OUTPATIENT
Start: 2021-03-09 | End: 2021-03-09

## 2021-03-09 RX ORDER — ZIPRASIDONE MESYLATE 20 MG/ML
20 INJECTION, POWDER, LYOPHILIZED, FOR SOLUTION INTRAMUSCULAR ONCE
Status: DISCONTINUED | OUTPATIENT
Start: 2021-03-09 | End: 2021-03-10 | Stop reason: HOSPADM

## 2021-03-09 RX ORDER — ACETAMINOPHEN 500 MG
1000 TABLET ORAL ONCE
Status: COMPLETED | OUTPATIENT
Start: 2021-03-09 | End: 2021-03-09

## 2021-03-09 RX ADMIN — ACETAMINOPHEN 1000 MG: 500 TABLET ORAL at 17:48

## 2021-03-09 RX ADMIN — LORAZEPAM 2 MG: 1 TABLET ORAL at 22:18

## 2021-03-09 RX ADMIN — LORAZEPAM 2 MG: 2 INJECTION, SOLUTION INTRAMUSCULAR; INTRAVENOUS at 10:43

## 2021-03-09 RX ADMIN — SULFAMETHOXAZOLE AND TRIMETHOPRIM 1 TABLET: 800; 160 TABLET ORAL at 17:49

## 2021-03-09 RX ADMIN — DIPHENHYDRAMINE HCL 50 MG: 25 TABLET ORAL at 23:20

## 2021-03-09 RX ADMIN — DIPHENHYDRAMINE HYDROCHLORIDE 50 MG: 50 INJECTION, SOLUTION INTRAMUSCULAR; INTRAVENOUS at 10:37

## 2021-03-09 NOTE — ED NOTES
Pt states there is bugs in her food that she is currently eating and this MA Chica Farias told pt there is no bugs in her food.      Prudence Patella  03/09/21 182
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
## 2021-03-09 NOTE — ED PROVIDER NOTES
3:29 PM SANDHYA Larkin was checked out to me by Dr. Emanuel Gaona for [facility] eval. Was agitated, received medication. Has a history of meth use in the past.  Please see his/her initial documentation for details of the patient's ED presentation, physical exam and completed studies. Patient's urinalysis shows large leuks, 87 WBCs and negative for bacteria. Did give her p.o. antibiotics for this. CBC within normal limits. CMP normal.  TSH normal.  Alcohol level negative. Salicylates and Tylenol within normal limits. CT head shows no acute findings. Chest x-ray shows no acute findings. UDS positive for marijuana and amphetamines. Patient medically cleared at 6:10 PM.. Jena Meredith, mental health RN, has seen patient and states she needs inpatient treatment. 10:55pm- rapid covid test is negative. Awaiting MH placement. Patient given PO benadryl and ativan. 11:41pm- Patient still agitated with pressured speech and pacing the room, will order IM geodon. Awaiting placement at end of my shift, will sign out to oncoming provider. 12:00 AM EST I have signed out [facility] Emergency Department care to Dr. Jelani Gray. We discussed the history, physical exam, completed/pending test results (if obtained) and current treatment plan. Please refer to his/her chart for the patients further details, remaining Emergency Department course, final disposition and diagnosis.          Raine Land MD  03/09/21 5311 [facility] Munira Powers MD  03/09/21 2524

## 2021-03-09 NOTE — ED PROVIDER NOTES
I independently examined and evaluated Ghada Jimenez. In brief their history revealed presented due to agitation. She was pink slipped by police as she was pushing appliances down the stairs because it was making the electricity bad. She reportedly was pulling out electrical outlets from the walls and drawing nonsensical diagrams on the walls. She seemed delusional and paranoid. Their focused exam revealed awake, alert, well-hydrated, well-nourished, and in no acute distress. Mucous membranes are moist. Speech is clear. Breathing is unlabored. Skin is dry. The patient has normal gait, moves all extremities, and is without facial droop. This EKG was interpreted by me. Rate is 82, rhythm is sinus. AR and QT intervals are within normal limits. There is no ST segment or T wave changes. Nonspecific T wave abnormality, V1, V2. No previous EKG currently available for comparison. ED course: 43-year-old female who presented with agitation, paranoid delusions. On arrival she was agitated, uncooperative. She was given Geodon, which it appears she has been on previously. This did seem to help. She is much more calm now. Laboratory work-up was initiated. She is currently awaiting completion of laboratory work-up for mental health crisis evaluation. All diagnostic, treatment, and disposition decisions were made by myself in conjunction with the Advanced Practice Provider. For all further details of the patient's emergency department visit, please see the Advanced Practice Provider's documentation.       Daly Ramona, DO  03/09/21 3300 [facility], DO  03/09/21 1427

## 2021-03-10 VITALS
TEMPERATURE: 97.6 F | WEIGHT: 140 LBS | HEART RATE: 102 BPM | SYSTOLIC BLOOD PRESSURE: 118 MMHG | BODY MASS INDEX: 21.22 KG/M2 | RESPIRATION RATE: 20 BRPM | DIASTOLIC BLOOD PRESSURE: 84 MMHG | HEIGHT: 68 IN | OXYGEN SATURATION: 100 %

## 2021-03-10 LAB
CULTURE: NORMAL
Lab: NORMAL
SPECIMEN: NORMAL

## 2021-03-10 PROCEDURE — 6360000002 HC RX W HCPCS: Performed by: EMERGENCY MEDICINE

## 2021-03-10 RX ORDER — ZIPRASIDONE MESYLATE 20 MG/ML
20 INJECTION, POWDER, LYOPHILIZED, FOR SOLUTION INTRAMUSCULAR ONCE
Status: COMPLETED | OUTPATIENT
Start: 2021-03-10 | End: 2021-03-10

## 2021-03-10 RX ORDER — DIPHENHYDRAMINE HYDROCHLORIDE 50 MG/ML
50 INJECTION INTRAMUSCULAR; INTRAVENOUS ONCE
Status: COMPLETED | OUTPATIENT
Start: 2021-03-10 | End: 2021-03-10

## 2021-03-10 RX ORDER — LORAZEPAM 2 MG/ML
2 INJECTION INTRAMUSCULAR ONCE
Status: COMPLETED | OUTPATIENT
Start: 2021-03-10 | End: 2021-03-10

## 2021-03-10 RX ADMIN — DIPHENHYDRAMINE HYDROCHLORIDE 50 MG: 50 INJECTION INTRAMUSCULAR; INTRAVENOUS at 11:08

## 2021-03-10 RX ADMIN — LORAZEPAM 2 MG: 2 INJECTION, SOLUTION INTRAMUSCULAR; INTRAVENOUS at 11:09

## 2021-03-10 RX ADMIN — ZIPRASIDONE MESYLATE 20 MG: 20 INJECTION, POWDER, LYOPHILIZED, FOR SOLUTION INTRAMUSCULAR at 00:49

## 2021-03-10 RX ADMIN — ZIPRASIDONE MESYLATE 20 MG: 20 INJECTION, POWDER, LYOPHILIZED, FOR SOLUTION INTRAMUSCULAR at 11:09

## 2021-03-10 NOTE — ED NOTES
[place] [facility] without RAPID COVID being completed and resulted. Will update ED Staff on above.      Teresita Laboy RN  27/13/51 8281

## 2021-03-10 NOTE — ED NOTES
Patient is asking to make a phone call, Peter Gary RN told patient she was able to make a phone call. Patient was requesting to take a shower Peter Gary RN supplied patient with wet wipes, clean underwear, mouth wash, and toothpaste so patient could clean herself up. Patient was unable to go to the showers due to express care not being opened and no one else would be over there with me and patient. Patient understood and was ok with the wet wipes and fresh underwear. Patient is a little agitated she said she needs to make a phone call but she is just staring at the phone, I asked if she needs help she stated \"Its morning I am not awake I will let you know when Im done\".  Patient is aware transport will be here at 9:30     QUANG [facility]  03/10/21 0979

## 2021-03-10 NOTE — ED NOTES
Patients breakfast arrived.  Patient up and eating breakfast and watching TV     Nathan Shearing  03/10/21 7940

## 2021-03-10 NOTE — ED NOTES
[place] from Anca Slater [place] [facility] been [place] @ [facility] by Dr. Janey Nicholson, Involuntary Admission - PINK SLIP, assigned to the ITU Unit. Call from [facility], transport arranged with [facility] for 0930 this morning, she will attempt to obtain an earlier transport time.      Graham Jeong, RN  82/05/36 1400 [address] [place] [address], RN  11/31/90 6382

## 2021-03-10 NOTE — ED NOTES
Patient is very agitated at this time, having issues speaking what she is thinking of and stated \"I should've committed suicide. \"     Alan Urban  03/09/21 3003

## 2021-03-10 NOTE — ED NOTES
Pt asking to use phone. This nurse advised pt that she does not believe that using the phone is a good idea at this time. Pt continues to have manic behavior and a flight of ideas. Pt became upset and told this nurse to leave her room.      Katie Schulte RN  03/09/21 9156

## 2021-03-10 NOTE — ED NOTES
[facility] 149, 7974 [facility]  43/36/78 [phone]

## 2021-03-10 NOTE — ED NOTES
Patient is very agitated at this time. Paranoid and states, \"I want to go out and smoke a cigarette, I want to use the phone and talk to my . \" Benitez LIND at the bedside and to put in orders for a Nicotine Patch. Patient states, \"I'm in correction. \" This RN attempted to calmly explain the reason the patient is pink slipped etc, but patient keeps going on a verbal rant with flight of ideas and is not listening at this time. \"     Blain Ahumada, MARTIN  03/09/21 1933
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
## 2021-03-10 NOTE — ED NOTES
Call from [facility] @ [facility], transport remains for 0930 AM today with [facility], nothing else is available at this time. Nurse to Nurse Report will need given later this morning prior to [facility] transport per [facility].      Enrique Gavin RN  37/71/91 [id number]

## 2021-03-10 NOTE — ED NOTES
Report called to [facility] at [facility] for Psychiatry at [phone]. Roderick Partida.  MARTIN Hollins  03/10/21 4284

## 2021-03-10 NOTE — ED NOTES
Jackelyn Ocampo RN @ [facility] for assistance with placement @ [facility]. RAPID COVID is completed and resulted - Negative.      Josef Banegas RN  32/49/49 1146

## 2021-03-10 NOTE — ED NOTES
Patient went passed sitter and ran into radiology area before being caught by staff. Patient refused to come back so she was assisted by staff. Provider notified. Dk Kumar.  MARTIN Hollins  03/10/21 6541

## 2021-03-10 NOTE — ED PROVIDER NOTES
Patient is endorsed to me by Dr. Jihan Shea at [address] [place] [address]. In short, patient presented with psychosis and polysubstance abuse. The patient was placed in suicide precautions, patient's clothing and belongings were removed, documented and stored in the emergency department. Patient was reported to me to be medically cleared. I have examined the patient and noted a normal exam and stable vitals. Mental health have evaluated the patientand haverecommended that the patient be transferred to a inpatient psychiatric facility. We are currently awaiting placement for the patient. Transferred to [facility].             Debbie Hu MD  03/10/21 2666

## 2021-03-10 NOTE — ED NOTES
Ordered patients a safety tray breakfast. Patient is awake, laying in bed watching TV. She is calm and stating she doesn't need anything at this time.       Snehal Mejias  03/10/21 0800

## 2021-03-12 LAB
EKG ATRIAL RATE: 82 BPM
EKG DIAGNOSIS: NORMAL
EKG P AXIS: 23 DEGREES
EKG P-R INTERVAL: 140 MS
EKG Q-T INTERVAL: 378 MS
EKG QRS DURATION: 84 MS
EKG QTC CALCULATION (BAZETT): 441 MS
EKG R AXIS: 25 DEGREES
EKG T AXIS: 56 DEGREES
EKG VENTRICULAR RATE: 82 BPM

## 2021-03-15 LAB
CHLAMYDIA TRACHOMATIS AMPLIFIED DET: NEGATIVE
N GONORRHOEAE AMPLIFIED DET: POSITIVE

## 2021-04-20 ENCOUNTER — HOSPITAL ENCOUNTER (EMERGENCY)
Age: 46
Discharge: HOME | End: 2021-04-21
Attending: EMERGENCY MEDICINE

## 2021-04-20 DIAGNOSIS — M54.50 CHRONIC BILATERAL LOW BACK PAIN WITHOUT SCIATICA: Primary | ICD-10-CM

## 2021-04-20 DIAGNOSIS — G89.29 CHRONIC BILATERAL LOW BACK PAIN WITHOUT SCIATICA: Primary | ICD-10-CM

## 2021-04-20 LAB
ALCOHOL SCREEN SERUM: <0.01 %WT/VOL
ANION GAP SERPL CALCULATED.3IONS-SCNC: 8 MMOL/L (ref 4–16)
BASOPHILS ABSOLUTE: 0 K/CU MM
BASOPHILS RELATIVE PERCENT: 0.3 % (ref 0–1)
BUN BLDV-MCNC: 13 MG/DL (ref 6–23)
CALCIUM SERPL-MCNC: 9.1 MG/DL (ref 8.3–10.6)
CHLORIDE BLD-SCNC: 103 MMOL/L (ref 99–110)
CO2: 29 MMOL/L (ref 21–32)
CREAT SERPL-MCNC: 0.6 MG/DL (ref 0.6–1.1)
DIFFERENTIAL TYPE: ABNORMAL
EOSINOPHILS ABSOLUTE: 0.5 K/CU MM
EOSINOPHILS RELATIVE PERCENT: 3.9 % (ref 0–3)
GFR AFRICAN AMERICAN: >60 ML/MIN/1.73M2
GFR NON-AFRICAN AMERICAN: >60 ML/MIN/1.73M2
GLUCOSE BLD-MCNC: 113 MG/DL (ref 70–99)
HCT VFR BLD CALC: 44.9 % (ref 37–47)
HEMOGLOBIN: 14.7 GM/DL (ref 12.5–16)
IMMATURE NEUTROPHIL %: 0.6 % (ref 0–0.43)
LYMPHOCYTES ABSOLUTE: 2.1 K/CU MM
LYMPHOCYTES RELATIVE PERCENT: 17.7 % (ref 24–44)
MCH RBC QN AUTO: 31 PG (ref 27–31)
MCHC RBC AUTO-ENTMCNC: 32.7 % (ref 32–36)
MCV RBC AUTO: 94.7 FL (ref 78–100)
MONOCYTES ABSOLUTE: 1.2 K/CU MM
MONOCYTES RELATIVE PERCENT: 9.8 % (ref 0–4)
NUCLEATED RBC %: 0 %
PDW BLD-RTO: 12.8 % (ref 11.7–14.9)
PLATELET # BLD: 208 K/CU MM (ref 140–440)
PMV BLD AUTO: 11.3 FL (ref 7.5–11.1)
POTASSIUM SERPL-SCNC: 3.7 MMOL/L (ref 3.5–5.1)
RBC # BLD: 4.74 M/CU MM (ref 4.2–5.4)
SEGMENTED NEUTROPHILS ABSOLUTE COUNT: 7.9 K/CU MM
SEGMENTED NEUTROPHILS RELATIVE PERCENT: 67.7 % (ref 36–66)
SODIUM BLD-SCNC: 140 MMOL/L (ref 135–145)
TOTAL IMMATURE NEUTOROPHIL: 0.07 K/CU MM
TOTAL NUCLEATED RBC: 0 K/CU MM
WBC # BLD: 11.7 K/CU MM (ref 4–10.5)

## 2021-04-20 PROCEDURE — G0480 DRUG TEST DEF 1-7 CLASSES: HCPCS

## 2021-04-20 PROCEDURE — 85025 COMPLETE CBC W/AUTO DIFF WBC: CPT

## 2021-04-20 PROCEDURE — 84702 CHORIONIC GONADOTROPIN TEST: CPT

## 2021-04-20 PROCEDURE — 99284 EMERGENCY DEPT VISIT MOD MDM: CPT

## 2021-04-20 PROCEDURE — 80048 BASIC METABOLIC PNL TOTAL CA: CPT

## 2021-04-21 VITALS
SYSTOLIC BLOOD PRESSURE: 118 MMHG | DIASTOLIC BLOOD PRESSURE: 59 MMHG | RESPIRATION RATE: 22 BRPM | HEART RATE: 79 BPM | TEMPERATURE: 97.6 F | OXYGEN SATURATION: 96 %

## 2021-04-21 LAB — GONADOTROPIN, CHORIONIC (HCG) QUANT: 0.5 UIU/ML

## 2021-04-21 NOTE — ED NOTES
Pt arrives to the ED via EMS with SPD. Per EMS pt is intoxicated. Pt reports she has not had anything to drink tonight and has not done any \"drugs\". SPD states she usually used fentanyl or heroin.       Ronak Nunez RN  04/20/21 6252
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
## 2021-07-03 ENCOUNTER — HOSPITAL ENCOUNTER (EMERGENCY)
Age: 46
Discharge: HOME | End: 2021-07-04
Attending: EMERGENCY MEDICINE
Payer: COMMERCIAL

## 2021-07-03 VITALS
DIASTOLIC BLOOD PRESSURE: 64 MMHG | BODY MASS INDEX: 21.22 KG/M2 | WEIGHT: 140 LBS | SYSTOLIC BLOOD PRESSURE: 110 MMHG | HEART RATE: 96 BPM | TEMPERATURE: 98.6 F | HEIGHT: 68 IN | RESPIRATION RATE: 18 BRPM | OXYGEN SATURATION: 96 %

## 2021-07-03 DIAGNOSIS — F19.10 POLYSUBSTANCE ABUSE (HCC): ICD-10-CM

## 2021-07-03 DIAGNOSIS — F10.10 ALCOHOL ABUSE: ICD-10-CM

## 2021-07-03 DIAGNOSIS — N30.00 ACUTE CYSTITIS WITHOUT HEMATURIA: ICD-10-CM

## 2021-07-03 DIAGNOSIS — R46.89 DISORGANIZED BEHAVIOR: ICD-10-CM

## 2021-07-03 DIAGNOSIS — R45.1 AGITATION: Primary | ICD-10-CM

## 2021-07-03 LAB
ACETAMINOPHEN LEVEL: <5 UG/ML (ref 15–30)
ALBUMIN SERPL-MCNC: 4.1 GM/DL (ref 3.4–5)
ALCOHOL SCREEN SERUM: 0.25 %WT/VOL
ALCOHOL SCREEN SERUM: <0.01 %WT/VOL
ALP BLD-CCNC: 67 IU/L (ref 40–129)
ALT SERPL-CCNC: 33 U/L (ref 10–40)
AMPHETAMINES: ABNORMAL
ANION GAP SERPL CALCULATED.3IONS-SCNC: 16 MMOL/L (ref 4–16)
AST SERPL-CCNC: 31 IU/L (ref 15–37)
BACTERIA: ABNORMAL /HPF
BARBITURATE SCREEN URINE: NEGATIVE
BASOPHILS ABSOLUTE: 0.1 K/CU MM
BASOPHILS RELATIVE PERCENT: 0.6 % (ref 0–1)
BENZODIAZEPINE SCREEN, URINE: NEGATIVE
BILIRUB SERPL-MCNC: 0.4 MG/DL (ref 0–1)
BILIRUBIN URINE: NEGATIVE MG/DL
BLOOD, URINE: ABNORMAL
BUN BLDV-MCNC: 10 MG/DL (ref 6–23)
CALCIUM OXALATE CRYSTALS: ABNORMAL /HPF
CALCIUM SERPL-MCNC: 9.2 MG/DL (ref 8.3–10.6)
CANNABINOID SCREEN URINE: ABNORMAL
CHLORIDE BLD-SCNC: 106 MMOL/L (ref 99–110)
CLARITY: ABNORMAL
CO2: 17 MMOL/L (ref 21–32)
COCAINE METABOLITE: NEGATIVE
COLOR: YELLOW
CREAT SERPL-MCNC: 0.7 MG/DL (ref 0.6–1.1)
DIFFERENTIAL TYPE: ABNORMAL
DOSE AMOUNT: ABNORMAL
DOSE AMOUNT: ABNORMAL
DOSE TIME: ABNORMAL
DOSE TIME: ABNORMAL
EOSINOPHILS ABSOLUTE: 0.6 K/CU MM
EOSINOPHILS RELATIVE PERCENT: 7.9 % (ref 0–3)
GFR AFRICAN AMERICAN: >60 ML/MIN/1.73M2
GFR NON-AFRICAN AMERICAN: >60 ML/MIN/1.73M2
GLUCOSE BLD-MCNC: 80 MG/DL (ref 70–99)
GLUCOSE, URINE: NEGATIVE MG/DL
HCT VFR BLD CALC: 47.2 % (ref 37–47)
HEMOGLOBIN: 16 GM/DL (ref 12.5–16)
HYALINE CASTS: 2 /LPF
IMMATURE NEUTROPHIL %: 0.3 % (ref 0–0.43)
KETONES, URINE: NEGATIVE MG/DL
LEUKOCYTE ESTERASE, URINE: ABNORMAL
LYMPHOCYTES ABSOLUTE: 3 K/CU MM
LYMPHOCYTES RELATIVE PERCENT: 37.8 % (ref 24–44)
MCH RBC QN AUTO: 31 PG (ref 27–31)
MCHC RBC AUTO-ENTMCNC: 33.9 % (ref 32–36)
MCV RBC AUTO: 91.5 FL (ref 78–100)
MONOCYTES ABSOLUTE: 0.5 K/CU MM
MONOCYTES RELATIVE PERCENT: 6.4 % (ref 0–4)
MUCUS: ABNORMAL HPF
NITRITE URINE, QUANTITATIVE: NEGATIVE
NUCLEATED RBC %: 0 %
OPIATES, URINE: NEGATIVE
OXYCODONE: NEGATIVE
PDW BLD-RTO: 13.4 % (ref 11.7–14.9)
PH, URINE: 6 (ref 5–8)
PHENCYCLIDINE, URINE: NEGATIVE
PLATELET # BLD: 203 K/CU MM (ref 140–440)
PMV BLD AUTO: 11 FL (ref 7.5–11.1)
POTASSIUM SERPL-SCNC: 3.5 MMOL/L (ref 3.5–5.1)
PROTEIN UA: NEGATIVE MG/DL
RBC # BLD: 5.16 M/CU MM (ref 4.2–5.4)
RBC URINE: 7 /HPF (ref 0–6)
SALICYLATE LEVEL: <0.3 MG/DL (ref 15–30)
SEGMENTED NEUTROPHILS ABSOLUTE COUNT: 3.7 K/CU MM
SEGMENTED NEUTROPHILS RELATIVE PERCENT: 47 % (ref 36–66)
SODIUM BLD-SCNC: 139 MMOL/L (ref 135–145)
SPECIFIC GRAVITY UA: 1.01 (ref 1–1.03)
SQUAMOUS EPITHELIAL: 1 /HPF
TOTAL IMMATURE NEUTOROPHIL: 0.02 K/CU MM
TOTAL NUCLEATED RBC: 0 K/CU MM
TOTAL PROTEIN: 6.9 GM/DL (ref 6.4–8.2)
TRICHOMONAS: ABNORMAL /HPF
UROBILINOGEN, URINE: NEGATIVE MG/DL (ref 0.2–1)
WBC # BLD: 7.9 K/CU MM (ref 4–10.5)
WBC CLUMP: ABNORMAL /HPF
WBC UA: 49 /HPF (ref 0–5)

## 2021-07-03 PROCEDURE — G0480 DRUG TEST DEF 1-7 CLASSES: HCPCS

## 2021-07-03 PROCEDURE — 96372 THER/PROPH/DIAG INJ SC/IM: CPT

## 2021-07-03 PROCEDURE — 81001 URINALYSIS AUTO W/SCOPE: CPT

## 2021-07-03 PROCEDURE — 80307 DRUG TEST PRSMV CHEM ANLYZR: CPT

## 2021-07-03 PROCEDURE — 80053 COMPREHEN METABOLIC PANEL: CPT

## 2021-07-03 PROCEDURE — 36415 COLL VENOUS BLD VENIPUNCTURE: CPT

## 2021-07-03 PROCEDURE — 6370000000 HC RX 637 (ALT 250 FOR IP): Performed by: EMERGENCY MEDICINE

## 2021-07-03 PROCEDURE — 85025 COMPLETE CBC W/AUTO DIFF WBC: CPT

## 2021-07-03 PROCEDURE — 6360000002 HC RX W HCPCS: Performed by: EMERGENCY MEDICINE

## 2021-07-03 PROCEDURE — 99285 EMERGENCY DEPT VISIT HI MDM: CPT

## 2021-07-03 RX ORDER — NITROFURANTOIN 25; 75 MG/1; MG/1
100 CAPSULE ORAL 2 TIMES DAILY
Qty: 14 CAPSULE | Refills: 0 | Status: SHIPPED | OUTPATIENT
Start: 2021-07-03 | End: 2021-07-10

## 2021-07-03 RX ORDER — LORAZEPAM 1 MG/1
1 TABLET ORAL ONCE
Status: COMPLETED | OUTPATIENT
Start: 2021-07-03 | End: 2021-07-03

## 2021-07-03 RX ORDER — GABAPENTIN 300 MG/1
600 CAPSULE ORAL 3 TIMES DAILY
Status: DISCONTINUED | OUTPATIENT
Start: 2021-07-03 | End: 2021-07-04 | Stop reason: HOSPADM

## 2021-07-03 RX ORDER — LORAZEPAM 2 MG/ML
3 INJECTION INTRAMUSCULAR ONCE
Status: COMPLETED | OUTPATIENT
Start: 2021-07-03 | End: 2021-07-03

## 2021-07-03 RX ORDER — NITROFURANTOIN 25; 75 MG/1; MG/1
100 CAPSULE ORAL ONCE
Status: COMPLETED | OUTPATIENT
Start: 2021-07-04 | End: 2021-07-04

## 2021-07-03 RX ADMIN — LORAZEPAM 3 MG: 2 INJECTION INTRAMUSCULAR; INTRAVENOUS at 13:45

## 2021-07-03 RX ADMIN — GABAPENTIN 600 MG: 300 CAPSULE ORAL at 22:25

## 2021-07-03 RX ADMIN — LORAZEPAM 1 MG: 1 TABLET ORAL at 21:25

## 2021-07-03 NOTE — ED NOTES
The patient continues to sleep and is breathing normally. All blood lab work ordered has been obtained, but no urine at this time. The sitter remains in the open doorway. The patient is covered in a warm blanket.       Trey Lira RN  07/03/21 9399

## 2021-07-03 NOTE — ED PROVIDER NOTES
CHIEF COMPLAINT  Chief Complaint   Patient presents with    Suicidal    Mental Health Problem       HPI  Vandana Urban is a 40 y.o. female with history of hepatitis C, methamphetamine abuse who presents with disorganized behavior, found running in traffic, rifling through cars that were unlocked and attempting to find keys to drive away. Police were called to her whereabouts several times today, on the final evaluation the patient was noted by police to be yelling random items, talking about León and attempting to run into traffic. Here the patient states \"my parents are really mad at me, you know León and [facility]\". History is somewhat limited as the patient is agitated, intermittently yelling at police. REVIEW OF SYSTEMS  Review of Systems   History obtained from chart review and the patient    Review of systems unable to be obtained as patient is intermittently yelling at police, distractible, she denies pain at this time, denies vomiting, diarrhea. She denies any chance of pregnancy, she is status post hysterectomy. PAST MEDICAL HISTORY  Past Medical History:   Diagnosis Date    Depression     Fibromyalgia     Hepatitis C     Stated by patient       FAMILY HISTORY  History reviewed. No pertinent family history. SOCIAL HISTORY  Social History     Socioeconomic History    Marital status: Unknown     Spouse name: None    Number of children: None    Years of education: None    Highest education level: None   Occupational History    None   Tobacco Use    Smoking status: Current Some Day Smoker     Types: Cigarettes     Last attempt to quit: 2020     Years since quittin.2    Smokeless tobacco: Never Used   Vaping Use    Vaping Use: Former   Substance and Sexual Activity    Alcohol use:  Yes    Drug use: Yes     Types: Methamphetamines     Comment: 20    Sexual activity: Yes   Other Topics Concern    None   Social History Narrative    None     Social Determinants of Health     Financial Resource Strain:     Difficulty of Paying Living Expenses:    Food Insecurity:     Worried About Running Out of Food in the Last Year:     [address] in the Last Year:    Transportation Needs:     Lack of Transportation (Medical):  Lack of Transportation (Non-Medical):    Physical Activity:     Days of Exercise per Week:     Minutes of Exercise per Session:    Stress:     Feeling of Stress :    Social Connections:     Frequency of Communication with Friends and Family:     Frequency of Social Gatherings with Friends and Family:     Attends Judaism Services:     Active Member of Clubs or Organizations:     Attends Club or Organization Meetings:     Marital Status:    Intimate Partner Violence:     Fear of Current or Ex-Partner:     Emotionally Abused:     Physically Abused:     Sexually Abused:        SURGICAL HISTORY  Past Surgical History:   Procedure Laterality Date    HYSTERECTOMY         CURRENT MEDICATIONS  No current facility-administered medications on file prior to encounter. Current Outpatient Medications on File Prior to Encounter   Medication Sig Dispense Refill    ibuprofen (ADVIL;MOTRIN) 600 MG tablet Take 1 tablet by mouth every 6 hours as needed for Pain 30 tablet 0    gabapentin (NEURONTIN) 100 MG capsule Take 8 capsules by mouth 3 times daily for 180 days.  Intended supply: 90 days 720 capsule 0    naproxen (NAPROSYN) 500 MG tablet Take 1 tablet by mouth 2 times daily 60 tablet 0    acetaminophen (TYLENOL) 325 MG tablet Take 2 tablets by mouth every 6 hours as needed for Pain 120 tablet 3    naproxen (NAPROSYN) 500 MG tablet Take 1 tablet by mouth 2 times daily 60 tablet 0    acetaminophen (TYLENOL) 325 MG tablet Take 2 tablets by mouth every 6 hours as needed for Pain 120 tablet 3    phenylephrine-cocoa butter (PREPARATION H) 0.25-88.44 % Place 1 suppository rectally as needed for Hemorrhoids or Itching 30 suppository 0    permethrin clubbing. Musculoskeletal: Good gross range of motion in all major joints. No major deformities noted. Neurologic: Alert & oriented x 3, Normal gross motor function, Normal gross sensory function, No focal deficits noted. Psychiatric: Affect labile with speech pressured, agitated        RADIOLOGY/PROCEDURES/LABS    Labs Reviewed   ACETAMINOPHEN LEVEL - Abnormal; Notable for the following components:       Result Value    Acetaminophen Level <5.0 (*)     All other components within normal limits   SALICYLATE LEVEL - Abnormal; Notable for the following components:    Salicylate Lvl <3.1 (*)     All other components within normal limits   CBC WITH AUTO DIFFERENTIAL - Abnormal; Notable for the following components:    Hematocrit 47.2 (*)     Monocytes % 6.4 (*)     Eosinophils % 7.9 (*)     All other components within normal limits   COMPREHENSIVE METABOLIC PANEL - Abnormal; Notable for the following components:    CO2 17 (*)     All other components within normal limits   ETHANOL - Abnormal; Notable for the following components:    Alcohol Scrn 0.25 (*)     All other components within normal limits   URINALYSIS WITH MICROSCOPIC   URINE DRUG SCREEN         Medications   LORazepam (ATIVAN) injection 3 mg (3 mg Intramuscular Given 7/3/21 5775)       COURSE & MEDICAL DECISION MAKING  Pertinent Labs & Imaging studies reviewed. (See chart for details)    41-year-old female presents with police for disorganized behavior, she told them \"just kill me, León and [facility]\". They brought her in on a pink slip. Here she is medically cleared upon awakening from Ativan which was given as she was significantly agitated, I was concerned for her safety as well as the staff safety during the time of her work-up. At time of recheck she is resting more comfortably. She does have elevated alcohol, we are still awaiting UA.   She will require evaluation by behavioral health with plan for disposition pending clinical sobriety, I do have suspicion that she is intoxicated on methamphetamines based on agitation, pressured speech. CRITICAL CARE NOTE:  There was a high probability of clinically significant life-threatening deterioration of the patient's condition requiring my urgent intervention due to concern for excited delirium, agitation. I M calming medications, reevaluation was performed to address this. Total critical care time is 15 minutes. This includes vital sign monitoring, pulse oximetry monitoring, telemetry monitoring, clinical response to the IV medications, reviewing the nursing notes, consultation time, dictation/documentation time, and interpretation of the lab work. This time excludes time spent performing procedures and separately billable procedures and family discussion time. FINAL IMPRESSION  Problem List Items Addressed This Visit     None      Visit Diagnoses     Agitation    -  Primary    Disorganized behavior          1.    2.   3.    Patient gave me permission to discuss medical history, care, and plan with those present in the room.   Electronically signed by: Tsering Hoff MD, 7/3/2021  MD Tsering Harrison MD  07/03/21 0389

## 2021-07-03 NOTE — ED PROVIDER NOTES
Emergency Department Encounter    Patient: Rebel Payan  MRN: [id number]  : 1975  Date of Evaluation: 7/3/2021  ED Provider:  Jennifer García MD     I discussed patient's presentation, work-up and findings to this point in her ED evaluation with the off going provider. Plan of care is for further behavioral health assessment and possible inpatient psychiatric admission. Briefly, Rebel Payan is a 40 y.o. female presented to the emergency department with thoughts of harming herself, marizol/psychosis after using methamphetamines and drinking a large amount of alcohol. .  Patient brought in by police. patient was agitated and combative on arrival.  Patient was given Ativan 3 mg IM. Patient was medically cleared; however urinalysis was pending. Patient is pending behavioral health assessment. Urinalysis showed signs of infection. I have reviewed and interpreted all of the currently available lab results from this visit (if applicable)  Results for orders placed or performed during the hospital encounter of 21   Acetaminophen Level   Result Value Ref Range    Acetaminophen Level <5.0 (L) 15 - 30 ug/ml    DOSE AMOUNT DOSE AMT. GIVEN - UNKNOWN     DOSE TIME DOSE TIME GIVEN - UNKNOWN    Salicylate   Result Value Ref Range    Salicylate Lvl <3.0 (L) 15 - 30 MG/DL    DOSE AMOUNT DOSE AMT.  GIVEN - UNKNOWN     DOSE TIME DOSE TIME GIVEN - UNKNOWN    CBC Auto Differential   Result Value Ref Range    WBC 7.9 4.0 - 10.5 K/CU MM    RBC 5.16 4.2 - 5.4 M/CU MM    Hemoglobin 16.0 12.5 - 16.0 GM/DL    Hematocrit 47.2 (H) 37 - 47 %    MCV 91.5 78 - 100 FL    MCH 31.0 27 - 31 PG    MCHC 33.9 32.0 - 36.0 %    RDW 13.4 11.7 - 14.9 %    Platelets 447 203 - 421 K/CU MM    MPV 11.0 7.5 - 11.1 FL    Differential Type AUTOMATED DIFFERENTIAL     Segs Relative 47.0 36 - 66 %    Lymphocytes % 37.8 24 - 44 %    Monocytes % 6.4 (H) 0 - 4 %    Eosinophils % 7.9 (H) 0 - 3 %    Basophils % 0.6 0 - 1 %    Segs Absolute 3.7 K/CU MM    Lymphocytes Absolute 3.0 K/CU MM    Monocytes Absolute 0.5 K/CU MM    Eosinophils Absolute 0.6 K/CU MM    Basophils Absolute 0.1 K/CU MM    Nucleated RBC % 0.0 %    Total Nucleated RBC 0.0 K/CU MM    Total Immature Neutrophil 0.02 K/CU MM    Immature Neutrophil % 0.3 0 - 0.43 %   Comprehensive Metabolic Panel   Result Value Ref Range    Sodium 139 135 - 145 MMOL/L    Potassium 3.5 3.5 - 5.1 MMOL/L    Chloride 106 99 - 110 mMol/L    CO2 17 (L) 21 - 32 MMOL/L    BUN 10 6 - 23 MG/DL    CREATININE 0.7 0.6 - 1.1 MG/DL    Glucose 80 70 - 99 MG/DL    Calcium 9.2 8.3 - 10.6 MG/DL    Albumin 4.1 3.4 - 5.0 GM/DL    Total Protein 6.9 6.4 - 8.2 GM/DL    Total Bilirubin 0.4 0.0 - 1.0 MG/DL    ALT 33 10 - 40 U/L    AST 31 15 - 37 IU/L    Alkaline Phosphatase 67 40 - 129 IU/L    GFR Non-African American >60 >60 mL/min/1.73m2    GFR African American >60 >60 mL/min/1.73m2    Anion Gap 16 4 - 16   Ethanol   Result Value Ref Range    Alcohol Scrn 0.25 (H) <0.01 %WT/VOL   Urinalysis with Microscopic   Result Value Ref Range    Color, UA YELLOW YELLOW    Clarity, UA HAZY (A) CLEAR    Glucose, Urine NEGATIVE NEGATIVE MG/DL    Bilirubin Urine NEGATIVE NEGATIVE MG/DL    Ketones, Urine NEGATIVE NEGATIVE MG/DL    Specific Gravity, UA 1.014 1.001 - 1.035    Blood, Urine MODERATE (A) NEGATIVE    pH, Urine 6.0 5.0 - 8.0    Protein, UA NEGATIVE NEGATIVE MG/DL    Urobilinogen, Urine NEGATIVE 0.2 - 1.0 MG/DL    Nitrite Urine, Quantitative NEGATIVE NEGATIVE    Leukocyte Esterase, Urine LARGE (A) NEGATIVE    RBC, UA 7 (H) 0 - 6 /HPF    WBC, UA 49 (H) 0 - 5 /HPF    Bacteria, UA RARE (A) NEGATIVE /HPF    WBC Clumps, UA RARE /HPF    Squam Epithel, UA 1 /HPF    Mucus, UA OCCASIONAL (A) NEGATIVE HPF    Trichomonas, UA NONE SEEN NONE SEEN /HPF    Hyaline Casts, UA 2 /LPF    Ca Oxalate Jalyn, UA OCCASIONAL /HPF   Urine Drug Screen   Result Value Ref Range    Cannabinoid Scrn, Ur UNCONFIRMED POSITIVE (A) NEGATIVE    Amphetamines UNCONFIRMED POSITIVE (A) NEGATIVE    Cocaine Metabolite NEGATIVE NEGATIVE    Benzodiazepine Screen, Urine NEGATIVE NEGATIVE    Barbiturate Screen, Ur NEGATIVE NEGATIVE    Opiates, Urine NEGATIVE NEGATIVE    Phencyclidine, Urine NEGATIVE NEGATIVE    Oxycodone NEGATIVE NEGATIVE   ETOH Blood   Result Value Ref Range    Alcohol Scrn <0.01 <0.01 %WT/VOL      Radiographs (if obtained):    [] Radiologist's Report Reviewed:  No orders to display       MDM:  Patient evaluated after EtOH came back less than 0.01. Patient requesting to be discharged home and denied any thoughts of hurting herself or others. Patient reported that she felt safe at home. Behavioral health assessment team also evaluated the patient felt she was safe for discharge to home. I do feel it is appropriate at this point time for her to be discharged with outpatient resources. Strict ED return precautions were given which included but was not limited to thoughts of hurting herself, others complications with alcohol use/abuse, drug use/abuse, chest pain, shortness of breath. urinalysis did show signs of a early urinary tract infection. Patient was given [place] [facility] in the emergency department. Patient will be discharged home with Macrobid times 7 days. Clinical Impression:  1. Agitation    2. Disorganized behavior    3. Alcohol abuse    4. Polysubstance abuse ([address]    5. Acute cystitis without hematuria      Disposition referral (if applicable):  No follow-up provider specified. Disposition medications (if applicable):  New Prescriptions    NITROFURANTOIN, MACROCRYSTAL-MONOHYDRATE, (MACROBID) 100 MG CAPSULE    Take 1 capsule by mouth 2 times daily for 7 days       Comment: Please note this report has been produced using speech recognition software and may contain errors related to that system including errors in grammar, punctuation, and spelling, as well as words and phrases that may be inappropriate. Efforts were made to edit the dictations. Cassandra Schuster MD  07/03/21 0519

## 2021-07-03 NOTE — ED NOTES
The patient has remained in room 24 and this nurse is attempting to assist her in removing clothing, but she remains agitated. Security is still at the doorway and this nurse or the tech is with the patient at all times.       Charles Baxter RN  07/03/21 5660
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
## 2021-07-03 NOTE — ED NOTES
Report received from [facility]. All questions answered at this time.      Diallo Luciano RN  07/03/21 5642

## 2021-07-04 PROCEDURE — 6370000000 HC RX 637 (ALT 250 FOR IP): Performed by: EMERGENCY MEDICINE

## 2021-07-04 RX ADMIN — NITROFURANTOIN MONOHYDRATE/MACROCRYSTALLINE 100 MG: 25; 75 CAPSULE ORAL at 00:04

## 2021-07-04 NOTE — ED NOTES
Pt up urine collected, pt educated about need for another ETOH level to be drawn later this evening, pt verbalized understanding, pt provided box lunch and tv dinner as requested     Hao Messer RN  07/03/21 2048

## 2021-07-04 NOTE — ED NOTES
Reviewed discharge information. Patient verbalized understanding of paperwork, medication, and care instructions. Patient denied any questions.      Sharon Mukherjee RN  07/04/21 [id number]

## 2021-07-04 NOTE — ED NOTES
..Provisional Diagnosis:     ETOH use  Methamphetamine use    Psychosocial and Contextual Factors:       Pt presents calm, cooperative    Explained to pt the reason police stated she was brought to the ED, pt reports she does not remember any of it    Pt denies SI, intent or plan    Pt denies HI, intent or plan    Pt denies AVH    Pt denies issues with appetite    Pt does report issues with falling asleep,    Pt denies any hx of mental health issues, does admit to amphetamine use and ETOH use, stated she does not drink often but does use amphetamines frequently    Pt adamantly denies SI/HI/AVH    Pt reports she is safe to discharge    Pt was educated on amphetamines and the behaviors and damage they can cause, pt verbalized understanding, pt was given multiple resources in the area to seek outpatient tx     Pt future oriented    Pt has fair insight, poor judgement, discussed with pt that no one could make her stop using drugs but that the drug use is more than likely putting her into unsafe situations, encouraged pt again to seek substance abuse tx, pt verbalized understanding    Discussed all above with Dr Ирина Henry ED physician who recommends discharge    C-SSRS Summary:      Patient: as above  Family: no hx shared  Agency: none      Present Suicidal Behavior:      Verbal: denies     Attempt: denies     Past Suicidal Behavior:     Verbal:unknown    Attempt:unknown      Self-Injurious/Self-Mutilation: denies    Trauma Identified:  Amphetamine use      Protective Factors:    Pt denies SI, intent or plan  Pt denies HI, intent or plan  Pt denies AVH  Pt denies issues with appetite  Pt denies any hx of mental health issues,   Pt adamantly denies SI/HI/AVH  Pt reports she is safe to discharge  Pt was educated on amphetamines and the behaviors and damage they can cause, pt verbalized understanding, pt was given multiple  resources in the area to seek outpatient tx   Pt future oriented        Risk Factors:    ETOH use  Methamphetamine use      Clinical Summary:    As above  Per Dr John Barbosa is to discharge    Electronically signed by Ember Jacobs RN on 7/3/2021 at 11:39 PM      Ember Jacobs RN  07/03/21 [address] [place] [address]

## 2021-07-04 NOTE — ED NOTES
Called lab to inquire about ETOH redraw as computer states collected at 2220 and nothing further, they stated it should be running, noted that it does not say in process,      Perez Wheat, MARTIN  07/03/21 5977

## 2021-07-04 NOTE — SUICIDE SAFETY PLAN
SAFETY PLAN    A suicide Safety Plan is a document that supports someone when they are having thoughts of suicide. Warning Signs that indicate a suicidal crisis may be developing: What (situations, thoughts, feelings, body sensations, behaviors, etc.) do you experience that lets you know you are beginning to think about suicide? Pt adamantly denies she was ever suicidal, does admit to drug use      Internal Coping Strategies:  What things can I do (relaxation techniques, hobbies, physical activities, etc.) to take my mind off my problems without contacting another person? Pt adamantly denies she was ever suicidal, does admit to drug use  Listen to music, go for a walke      People and social settings that provide distraction: Who can I call or where can I go to distract me? Pt adamantly denies she was ever suicidal, does admit to drug use  Friends, family    People whom I can ask for help: Who can I call when I need help - for example, friends, family, clergy, someone else? Pt adamantly denies she was ever suicidal, does admit to drug use  Friends, family    Professionals or [address] [place], [place] [address]vd I can contact during a crisis: Who can I call for help - for example, my doctor, my psychiatrist, my psychologist, a mental health provider, a suicide hotline? Suicide Prevention Lifeline: 2-534-904-TALK (9065) [address] [phone]    Making the environment safe: How can I make my environment (house/apartment/living space) safer? For example, can I remove guns, medications, and other items?   Pt adamantly denies she was ever suicidal, does admit to drug use  Stop using amphetamines